# Patient Record
Sex: MALE | Race: WHITE | Employment: OTHER
[De-identification: names, ages, dates, MRNs, and addresses within clinical notes are randomized per-mention and may not be internally consistent; named-entity substitution may affect disease eponyms.]

---

## 2023-10-17 SDOH — HEALTH STABILITY: PHYSICAL HEALTH: ON AVERAGE, HOW MANY MINUTES DO YOU ENGAGE IN EXERCISE AT THIS LEVEL?: 30 MIN

## 2023-10-17 SDOH — HEALTH STABILITY: PHYSICAL HEALTH: ON AVERAGE, HOW MANY DAYS PER WEEK DO YOU ENGAGE IN MODERATE TO STRENUOUS EXERCISE (LIKE A BRISK WALK)?: 3 DAYS

## 2023-10-17 ASSESSMENT — SOCIAL DETERMINANTS OF HEALTH (SDOH)
WITHIN THE LAST YEAR, HAVE YOU BEEN HUMILIATED OR EMOTIONALLY ABUSED IN OTHER WAYS BY YOUR PARTNER OR EX-PARTNER?: NO
WITHIN THE LAST YEAR, HAVE YOU BEEN AFRAID OF YOUR PARTNER OR EX-PARTNER?: NO
WITHIN THE LAST YEAR, HAVE TO BEEN RAPED OR FORCED TO HAVE ANY KIND OF SEXUAL ACTIVITY BY YOUR PARTNER OR EX-PARTNER?: NO
WITHIN THE LAST YEAR, HAVE YOU BEEN KICKED, HIT, SLAPPED, OR OTHERWISE PHYSICALLY HURT BY YOUR PARTNER OR EX-PARTNER?: NO

## 2023-10-19 ENCOUNTER — OFFICE VISIT (OUTPATIENT)
Dept: INTERNAL MEDICINE CLINIC | Facility: CLINIC | Age: 63
End: 2023-10-19
Payer: MEDICARE

## 2023-10-19 VITALS
BODY MASS INDEX: 24.49 KG/M2 | WEIGHT: 147 LBS | DIASTOLIC BLOOD PRESSURE: 74 MMHG | OXYGEN SATURATION: 97 % | HEART RATE: 86 BPM | HEIGHT: 65 IN | SYSTOLIC BLOOD PRESSURE: 133 MMHG | TEMPERATURE: 97.6 F

## 2023-10-19 DIAGNOSIS — M79.672 PAIN IN BOTH FEET: ICD-10-CM

## 2023-10-19 DIAGNOSIS — M79.671 PAIN IN BOTH FEET: ICD-10-CM

## 2023-10-19 DIAGNOSIS — J30.9 ALLERGIC RHINITIS, UNSPECIFIED SEASONALITY, UNSPECIFIED TRIGGER: ICD-10-CM

## 2023-10-19 DIAGNOSIS — E78.2 MIXED HYPERLIPIDEMIA: Primary | ICD-10-CM

## 2023-10-19 DIAGNOSIS — M25.522 PAIN OF BOTH ELBOWS: ICD-10-CM

## 2023-10-19 DIAGNOSIS — Z12.5 PROSTATE CANCER SCREENING: ICD-10-CM

## 2023-10-19 DIAGNOSIS — M25.521 PAIN OF BOTH ELBOWS: ICD-10-CM

## 2023-10-19 PROCEDURE — 99204 OFFICE O/P NEW MOD 45 MIN: CPT | Performed by: INTERNAL MEDICINE

## 2023-10-19 PROCEDURE — 1123F ACP DISCUSS/DSCN MKR DOCD: CPT | Performed by: INTERNAL MEDICINE

## 2023-10-19 RX ORDER — MONTELUKAST SODIUM 10 MG/1
10 TABLET ORAL DAILY
Qty: 90 TABLET | Refills: 1 | Status: SHIPPED | OUTPATIENT
Start: 2023-10-19

## 2023-10-19 RX ORDER — LEVOCETIRIZINE DIHYDROCHLORIDE 5 MG/1
5 TABLET, FILM COATED ORAL NIGHTLY
COMMUNITY

## 2023-10-19 SDOH — ECONOMIC STABILITY: HOUSING INSECURITY
IN THE LAST 12 MONTHS, WAS THERE A TIME WHEN YOU DID NOT HAVE A STEADY PLACE TO SLEEP OR SLEPT IN A SHELTER (INCLUDING NOW)?: NO

## 2023-10-19 SDOH — ECONOMIC STABILITY: FOOD INSECURITY: WITHIN THE PAST 12 MONTHS, THE FOOD YOU BOUGHT JUST DIDN'T LAST AND YOU DIDN'T HAVE MONEY TO GET MORE.: NEVER TRUE

## 2023-10-19 SDOH — ECONOMIC STABILITY: INCOME INSECURITY: HOW HARD IS IT FOR YOU TO PAY FOR THE VERY BASICS LIKE FOOD, HOUSING, MEDICAL CARE, AND HEATING?: NOT HARD AT ALL

## 2023-10-19 SDOH — ECONOMIC STABILITY: FOOD INSECURITY: WITHIN THE PAST 12 MONTHS, YOU WORRIED THAT YOUR FOOD WOULD RUN OUT BEFORE YOU GOT MONEY TO BUY MORE.: NEVER TRUE

## 2023-10-19 ASSESSMENT — ENCOUNTER SYMPTOMS
DIARRHEA: 0
EYE ITCHING: 0
ABDOMINAL PAIN: 0
EYE DISCHARGE: 0
SORE THROAT: 0
VOICE CHANGE: 0
SHORTNESS OF BREATH: 1
RHINORRHEA: 0
BACK PAIN: 0
NAUSEA: 0
COLOR CHANGE: 0
VOMITING: 0
BLOOD IN STOOL: 0
CONSTIPATION: 0
WHEEZING: 0
COUGH: 1
ANAL BLEEDING: 0

## 2023-10-19 ASSESSMENT — ANXIETY QUESTIONNAIRES
IF YOU CHECKED OFF ANY PROBLEMS ON THIS QUESTIONNAIRE, HOW DIFFICULT HAVE THESE PROBLEMS MADE IT FOR YOU TO DO YOUR WORK, TAKE CARE OF THINGS AT HOME, OR GET ALONG WITH OTHER PEOPLE: NOT DIFFICULT AT ALL
5. BEING SO RESTLESS THAT IT IS HARD TO SIT STILL: 0
7. FEELING AFRAID AS IF SOMETHING AWFUL MIGHT HAPPEN: 0
1. FEELING NERVOUS, ANXIOUS, OR ON EDGE: 0
6. BECOMING EASILY ANNOYED OR IRRITABLE: 0
GAD7 TOTAL SCORE: 0
2. NOT BEING ABLE TO STOP OR CONTROL WORRYING: 0
3. WORRYING TOO MUCH ABOUT DIFFERENT THINGS: 0
4. TROUBLE RELAXING: 0

## 2023-10-19 ASSESSMENT — PATIENT HEALTH QUESTIONNAIRE - PHQ9
SUM OF ALL RESPONSES TO PHQ QUESTIONS 1-9: 0
2. FEELING DOWN, DEPRESSED OR HOPELESS: 0
SUM OF ALL RESPONSES TO PHQ9 QUESTIONS 1 & 2: 0
SUM OF ALL RESPONSES TO PHQ QUESTIONS 1-9: 0
1. LITTLE INTEREST OR PLEASURE IN DOING THINGS: 0
SUM OF ALL RESPONSES TO PHQ QUESTIONS 1-9: 0
SUM OF ALL RESPONSES TO PHQ QUESTIONS 1-9: 0

## 2023-10-19 NOTE — PROGRESS NOTES
Jp Herbert M.D. Internal Medicine  [facility]  [address] [place] [address], [address] [place] [address]  Phone: [phone]  Fax: [phone]    Hyperlipidemia  This is a chronic problem. The current episode started more than 1 year ago. The problem is uncontrolled. Recent lipid tests were reviewed and are high. There are no known factors aggravating his hyperlipidemia. Associated symptoms include shortness of breath. Pertinent negatives include no chest pain or myalgias. He is currently on no antihyperlipidemic treatment. The current treatment provides no improvement of lipids. There are no compliance problems. Risk factors for coronary artery disease include male sex and dyslipidemia. Peter Vines is a 70 y.o. White (non-) male. Current Outpatient Medications   Medication Sig Dispense Refill    levocetirizine (XYZAL) 5 MG tablet Take 1 tablet by mouth nightly      montelukast (SINGULAIR) 10 MG tablet Take 1 tablet by mouth daily 90 tablet 1     No current facility-administered medications for this visit. Allergies   Allergen Reactions    Penicillins Hives and Other (See Comments)    Sulfa Antibiotics Hives and Other (See Comments)     Past Medical History:   Diagnosis Date    Allergic rhinitis     HLD (hyperlipidemia)      History reviewed. No pertinent surgical history. Social History     Tobacco Use    Smoking status: Never    Smokeless tobacco: Never   Vaping Use    Vaping Use: Never used   Substance Use Topics    Alcohol use: Never     Family History   Problem Relation Age of Onset    Diabetes Mother     Heart Murmur Father     Liver Cancer Father     Colon Cancer Neg Hx       Review of Systems   Constitutional:  Negative for chills, diaphoresis, fatigue, fever and unexpected weight change. HENT:  Negative for ear discharge, ear pain, hearing loss, postnasal drip, rhinorrhea, sore throat, tinnitus and voice change. Eyes:  Negative for discharge, itching and visual disturbance.

## 2023-10-23 ENCOUNTER — TELEPHONE (OUTPATIENT)
Dept: INTERNAL MEDICINE CLINIC | Facility: CLINIC | Age: 63
End: 2023-10-23

## 2023-10-23 NOTE — TELEPHONE ENCOUNTER
----- Message from Collette Puna sent at 10/23/2023  9:22 AM EDT -----  Subject: Referral Request    Reason for referral request? Ai Aj is saying the paperwork he has just is   saying an external referral to podiatry and he is not certain what that   mean? Please call to let him know who he should be checking with for an   appointment. Provider patient wants to be referred to(if known):     Provider Phone Number(if known):     Additional Information for Provider?   ---------------------------------------------------------------------------  --------------  [address]    [phone]; OK to respond with electronic message via [employer] portal (only   for patients who have registered [employer] account)  ---------------------------------------------------------------------------  --------------

## 2023-11-02 ENCOUNTER — CLINICAL DOCUMENTATION (OUTPATIENT)
Dept: ORTHOPEDIC SURGERY | Age: 63
End: 2023-11-02

## 2023-11-02 NOTE — PROGRESS NOTES
Patient asked that we request elbow notes from Dr Karan Whitney [id number] for upcoming visit w/ Dr Cheryl Steen. Request faxed.

## 2023-11-14 ENCOUNTER — OFFICE VISIT (OUTPATIENT)
Dept: ENT CLINIC | Age: 63
End: 2023-11-14
Payer: MEDICARE

## 2023-11-14 VITALS
DIASTOLIC BLOOD PRESSURE: 76 MMHG | BODY MASS INDEX: 23.82 KG/M2 | RESPIRATION RATE: 17 BRPM | WEIGHT: 143 LBS | SYSTOLIC BLOOD PRESSURE: 122 MMHG | HEIGHT: 65 IN

## 2023-11-14 DIAGNOSIS — J34.3 NASAL TURBINATE HYPERTROPHY: ICD-10-CM

## 2023-11-14 DIAGNOSIS — M95.0 NASAL VALVE COLLAPSE: ICD-10-CM

## 2023-11-14 DIAGNOSIS — J34.89 NASAL OBSTRUCTION: Primary | ICD-10-CM

## 2023-11-14 DIAGNOSIS — J34.2 DEVIATED NASAL SEPTUM: ICD-10-CM

## 2023-11-14 DIAGNOSIS — J30.9 CHRONIC ALLERGIC RHINITIS: ICD-10-CM

## 2023-11-14 PROCEDURE — 31231 NASAL ENDOSCOPY DX: CPT | Performed by: STUDENT IN AN ORGANIZED HEALTH CARE EDUCATION/TRAINING PROGRAM

## 2023-11-14 PROCEDURE — 1123F ACP DISCUSS/DSCN MKR DOCD: CPT | Performed by: STUDENT IN AN ORGANIZED HEALTH CARE EDUCATION/TRAINING PROGRAM

## 2023-11-14 PROCEDURE — 99204 OFFICE O/P NEW MOD 45 MIN: CPT | Performed by: STUDENT IN AN ORGANIZED HEALTH CARE EDUCATION/TRAINING PROGRAM

## 2023-11-14 RX ORDER — FLUTICASONE PROPIONATE 50 MCG
1 SPRAY, SUSPENSION (ML) NASAL DAILY
COMMUNITY

## 2023-11-14 ASSESSMENT — ENCOUNTER SYMPTOMS
CONSTIPATION: 0
FACIAL SWELLING: 0
DIARRHEA: 0
SHORTNESS OF BREATH: 0
SINUS PRESSURE: 0
EYE ITCHING: 0
WHEEZING: 0
CHOKING: 0
EYE DISCHARGE: 0
RHINORRHEA: 1
APNEA: 0
COUGH: 0
SINUS PAIN: 0
STRIDOR: 0
EYE PAIN: 0
NAUSEA: 0

## 2023-11-14 NOTE — PROGRESS NOTES
1st attempt to call regarding COVID-19 Virtual Care Program. Left voicemail with [phone] number to return call. Will attempt call again.    If patient returns this call, please do not send a message to the person that called them. Please follow Knowledgebase   Workflows. Please document the outcome and reason for call.     HPI:  Parmjit Christopher is a 70 y.o. male seen New    Chief Complaint   Patient presents with    Allergic Rhinitis      Patient presents today with c/o chronic allergic rhinitis x most of his life . Patient states that he also possibly has deviation as well . Patient would like to discuss potential surgery . Patient states that he has been recommended to see Allergist .  He has not seen much improvement with current regimen ( flonase , xyzal , and singulair )        68-year-old male seen as a new patient referral evaluation having concern of long-term sinonasal congestion and obstruction and allergic rhinitis. He has had this for the majority of his life and did have nasal trauma as an adolescent. He has had severe nasal obstruction since his teenage years. Now that he is retired he is going to go forward with potential surgical options to fix this since he has more time. He has been told that he has a DV nasal septum in the past.  He has done very long-term treatment for the nasal obstruction and for his allergic rhinitis. He continues to have significant sneezing and rhinorrhea despite long-term intranasal allergy sprays oral antihistamines and Singulair. Past Medical History, Past Surgical History, Family history, Social History, and Medications were all reviewed with the patient today and updated as necessary. Allergies   Allergen Reactions    Penicillins Hives and Other (See Comments)    Sulfa Antibiotics Hives and Other (See Comments)       There is no problem list on file for this patient. Current Outpatient Medications   Medication Sig    fluticasone (FLONASE) 50 MCG/ACT nasal spray 1 spray by Each Nostril route daily    levocetirizine (XYZAL) 5 MG tablet Take 1 tablet by mouth nightly    montelukast (SINGULAIR) 10 MG tablet Take 1 tablet by mouth daily     No current facility-administered medications for this visit.        Past Medical History:   Diagnosis Date    Allergic rhinitis     Fracture of

## 2023-11-15 ENCOUNTER — TELEPHONE (OUTPATIENT)
Dept: INTERNAL MEDICINE CLINIC | Facility: CLINIC | Age: 63
End: 2023-11-15

## 2023-11-15 RX ORDER — ROSUVASTATIN CALCIUM 40 MG/1
40 TABLET, COATED ORAL DAILY
Qty: 90 TABLET | Refills: 1 | Status: SHIPPED | OUTPATIENT
Start: 2023-11-15

## 2023-11-15 NOTE — TELEPHONE ENCOUNTER
Spoke with patient advised per  Ca score pos at about 100 meaning he does have some CAD. Based on this I would recommend Statin therapy, Crestor 40, to lower risk for morbidity/mortality from CAD. Pt expressed understanding, requested it be sent to pharmacy.

## 2023-11-15 NOTE — TELEPHONE ENCOUNTER
----- Message from Melissa Rubio MD sent at 11/15/2023  7:23 AM EST -----  Ca score pos at about 100 meaning he does have some CAD. Based on this I would recommend Statin therapy, Crestor 40, to lower risk for morbidity/mortality from CAD.

## 2023-11-16 ENCOUNTER — OFFICE VISIT (OUTPATIENT)
Dept: ORTHOPEDIC SURGERY | Age: 63
End: 2023-11-16
Payer: MEDICARE

## 2023-11-16 DIAGNOSIS — M77.02 BILATERAL MEDIAL EPICONDYLITIS OF ELBOW JOINT: Primary | ICD-10-CM

## 2023-11-16 DIAGNOSIS — M77.01 BILATERAL MEDIAL EPICONDYLITIS OF ELBOW JOINT: Primary | ICD-10-CM

## 2023-11-16 PROCEDURE — 99204 OFFICE O/P NEW MOD 45 MIN: CPT | Performed by: ORTHOPAEDIC SURGERY

## 2023-11-16 PROCEDURE — 1123F ACP DISCUSS/DSCN MKR DOCD: CPT | Performed by: ORTHOPAEDIC SURGERY

## 2023-12-07 ENCOUNTER — EVALUATION (OUTPATIENT)
Age: 63
End: 2023-12-07

## 2023-12-07 DIAGNOSIS — R29.898 WEAKNESS OF BOTH UPPER EXTREMITIES: ICD-10-CM

## 2023-12-07 DIAGNOSIS — M25.811 IMPINGEMENT OF RIGHT SHOULDER: ICD-10-CM

## 2023-12-07 DIAGNOSIS — Z78.9 IMPAIRED MOTOR CONTROL: ICD-10-CM

## 2023-12-07 DIAGNOSIS — M77.01 EPICONDYLITIS ELBOW, MEDIAL, RIGHT: Primary | ICD-10-CM

## 2023-12-07 DIAGNOSIS — M77.12 EPICONDYLITIS, LATERAL, LEFT: ICD-10-CM

## 2023-12-07 NOTE — PROGRESS NOTES
Flexion No no    Abduction no no    Functional  ER T2 T2    Functional IR T7 T8    Elbow      Flexion No No    Extension no no    Wrist      Flexion No no    Extension no no      Strength/MMT (0-5 Scale): Shoulder Right Left Comment   Flexion 5 5    Abduction 5 5    Scaption 5 5    IR  5* 5    ER 4 4    Lower trapezius 4 4    Middle trapezius 4+ 4+    Posterior deltoid 4 4    Elbow      Flexion 5 5    Extension 4+ 4+    Wrist      Flexion 5 5    Extension 5 5     (lbs) 48.7 49.3 Progressive loss on R     Special Tests/Function  Shoulder: Shoulder Impingement: Painful Arc: --  Infraspinatus: -  Crocker-Gilberto: -  Neer's: +  RTC Tendinopathy: Drop Arm: -  Elbow: Stability: Varus: -  Valgus: -  Lateral Epicondylalgia: Cozen's: +  Mill's: +  Medial Epicondylalgia: Mill's: +    Treatment provided today consisted of initial evaluation followed by:  Manual therapy (29632) x 15 min utilizing techniques to improve joint and/or soft tissue mobility, ROM, and function as well as helping to decrease pain/spasms and swelling. **Patient educated on and provided verbal consent for grade 5 mobilizations  Palpation and assessment of soft tissue, muscles, and landmarks   Thoracic mobilization  Prone extension (T9/6/3) grade 5    Therapeutic exercise (81476) x 10 min to address ROM/strength deficits and to develop an initial HEP as noted below. Patient Education on the condition/pathology, involved anatomy, and exercise rationale.     CLINICAL DECISION MAKING/ASSESSMENT     Personal Factors/co-morbidities affecting POC (1-2 Medium/3+High): age  coping styles/strategies  handedness  habits/routines   Problem List: (1-2 Low/ 3 Medium/ 4+ High) Pain  ROM limitations  Soft tissue restrictions  Strength deficits  Motor control deficits  Restricted recreational participation    Clinical decision making: moderate complexity with questionable prediction of expectations and future outcomes which may require adjustments to the

## 2023-12-12 ENCOUNTER — TREATMENT (OUTPATIENT)
Age: 63
End: 2023-12-12
Payer: MEDICARE

## 2023-12-12 DIAGNOSIS — M77.01 EPICONDYLITIS ELBOW, MEDIAL, RIGHT: Primary | ICD-10-CM

## 2023-12-12 DIAGNOSIS — M25.811 IMPINGEMENT OF RIGHT SHOULDER: ICD-10-CM

## 2023-12-12 DIAGNOSIS — M77.12 EPICONDYLITIS, LATERAL, LEFT: ICD-10-CM

## 2023-12-12 DIAGNOSIS — Z78.9 IMPAIRED MOTOR CONTROL: ICD-10-CM

## 2023-12-12 DIAGNOSIS — R29.898 WEAKNESS OF BOTH UPPER EXTREMITIES: ICD-10-CM

## 2023-12-12 PROCEDURE — 97140 MANUAL THERAPY 1/> REGIONS: CPT | Performed by: PHYSICAL THERAPIST

## 2023-12-12 PROCEDURE — 97110 THERAPEUTIC EXERCISES: CPT | Performed by: PHYSICAL THERAPIST

## 2023-12-12 NOTE — PROGRESS NOTES
and/or soft tissue mobility, ROM, and function as well as helping to decrease pain/spasms and swelling. Palpation and assessment of soft tissue, muscles, and landmarks   STM to bilateral wrist flexors and wrist extensors on the L. MWM  to elbow with pt simultaneously squeezing object. Therapeutic exercise (76024) x 20 min to address ROM/strength deficits  Passive stretching to bilateral wrist flexors and extensors  Kinesiotaping \"I\" strip using mechanical hold to unload extensor wad off L lateral condyle and R medial condyle using % tension  \"I\" strip going distal>proximal over wrist flexors using 15% tension to relax   Patient Education on how to properly stretch wrist flexors/extensors, and to stop doing exercises that entail gripping to take the pressure off insertion of wrist flexors and extensors. Educated in use of tape and safe removal.    ASSESSMENT     Pt presents with lateral epicondylitis on L and medial epicondylitis on R. He has mod tightness throughout his wrist flexors and extensors and would benefit from continued STM, joint mobilizations, stretching and taping. He is more aware of what activities he should avoid to prevent exacerbation of symptoms. PLAN      Cont therapy as per plan  On next visit: reassess effectiveness of last visit  continued STM, joint mobilizations, stretching and taping. Effective Dates/Duration: 12/7/2023 TO 2/5/2024 (60 days).     Frequency: 2x/week   Interventions may include but are not limited to: (10499) Therapeutic exercise to develop ROM, strength, endurance and flexibility  (69125) Therapeutic activities using dynamic activities to improve function  (58702) Manual therapy techniques to improve joint and/or soft tissue mobility, ROM, and function as well as helping to decrease pain/spasms and swelling  (46516/65390) Dry needling for the management of neuromusculoskeletal pain and movement impairment  Home exercise program (HEP) development  Modalities

## 2023-12-14 ENCOUNTER — TREATMENT (OUTPATIENT)
Age: 63
End: 2023-12-14

## 2023-12-14 DIAGNOSIS — M77.12 EPICONDYLITIS, LATERAL, LEFT: ICD-10-CM

## 2023-12-14 DIAGNOSIS — M25.811 IMPINGEMENT OF RIGHT SHOULDER: ICD-10-CM

## 2023-12-14 DIAGNOSIS — R29.898 WEAKNESS OF BOTH UPPER EXTREMITIES: ICD-10-CM

## 2023-12-14 DIAGNOSIS — M77.01 EPICONDYLITIS ELBOW, MEDIAL, RIGHT: Primary | ICD-10-CM

## 2023-12-14 NOTE — PROGRESS NOTES
QuickDASH Score to </=  10% impairment, demonstrating improved overall function. Belly  Access Code: [email or web address]  URL: [email or web address]  Date: 12/07/2023  Prepared by: Karishma Goodrich    Exercises  - Seated Thoracic Extension  - 2 x daily - 10 reps - 5 hold  - Reverse Push Ups  - 2 x daily - 2 sets - 15 reps - 2 hold  - Doorway Pec Stretch at 60 Degrees Abduction with Arm Straight  - 2 x daily - 10 reps - 5 hold

## 2023-12-28 ENCOUNTER — TREATMENT (OUTPATIENT)
Age: 63
End: 2023-12-28
Payer: MEDICARE

## 2023-12-28 DIAGNOSIS — R29.898 WEAKNESS OF BOTH UPPER EXTREMITIES: ICD-10-CM

## 2023-12-28 DIAGNOSIS — M77.01 EPICONDYLITIS ELBOW, MEDIAL, RIGHT: Primary | ICD-10-CM

## 2023-12-28 DIAGNOSIS — M77.12 EPICONDYLITIS, LATERAL, LEFT: ICD-10-CM

## 2023-12-28 DIAGNOSIS — M25.811 IMPINGEMENT OF RIGHT SHOULDER: ICD-10-CM

## 2023-12-28 DIAGNOSIS — Z78.9 IMPAIRED MOTOR CONTROL: ICD-10-CM

## 2023-12-28 PROCEDURE — 97140 MANUAL THERAPY 1/> REGIONS: CPT

## 2023-12-28 PROCEDURE — 97110 THERAPEUTIC EXERCISES: CPT

## 2023-12-28 PROCEDURE — 97530 THERAPEUTIC ACTIVITIES: CPT

## 2023-12-28 NOTE — PROGRESS NOTES
[address] [address]  Dept: [phone]     Physical Therapy Daily Note     Referring MD: Claire Suero MD  Diagnosis:     ICD-10-CM    1. Epicondylitis elbow, medial, right  M77.01       2. Epicondylitis, lateral, left  M77.12       3. Impingement of right shoulder  M25.811       4. Weakness of both upper extremities  R29.898       5. Impaired motor control  Z78.9          Surgery: n/a    Therapy precautions:None  Co-morbidities affecting plan of care: n/a    PERTINENT MEDICAL HISTORY     Past medical and surgical history:   Past Medical History:   Diagnosis Date    Allergic rhinitis     Fracture of nasal bone My nose was hit first when I was 13 and again when I was 50 causing a severe deviated septum. HLD (hyperlipidemia)       No past surgical history on file. Medications: reviewed in chart   Allergies: Allergies   Allergen Reactions    Penicillins Hives and Other (See Comments)    Sulfa Antibiotics Hives and Other (See Comments)      Chief complaints/history of injury: Patient reports onset of R medial elbow pain approximately 1 year ago. He did not have a specific TERNA, but recalls transitioning from playing golf outdoors on grass to a mat; he believes the different surface created more pressure when swinging through, as the mat resists the clubhead more. Since original onset, he attempted to adjust his wing, but has experienced onset of L lateral elbow pain and R anterior shoulder pain. There is minimal difficulty in ADLs, but it is affecting his ability to enjoy golf. He states he is planning to take the time to rest and rehab this winter so he can be fully healthy.     Date symptoms began: 11/2022  Tia Mccarthy of condition: Chronic (continuous duration > 3 months)  Primary cause of current episode: Repetitive  How did symptoms start: see above  Describe current symptoms: B elbow discomfort with gripping and rotation    Received previous outpatient

## 2024-01-02 ENCOUNTER — TREATMENT (OUTPATIENT)
Age: 64
End: 2024-01-02
Payer: MEDICARE

## 2024-01-02 DIAGNOSIS — Z78.9 IMPAIRED MOTOR CONTROL: ICD-10-CM

## 2024-01-02 DIAGNOSIS — M25.811 IMPINGEMENT OF RIGHT SHOULDER: ICD-10-CM

## 2024-01-02 DIAGNOSIS — R29.898 WEAKNESS OF BOTH UPPER EXTREMITIES: ICD-10-CM

## 2024-01-02 DIAGNOSIS — M77.12 EPICONDYLITIS, LATERAL, LEFT: ICD-10-CM

## 2024-01-02 DIAGNOSIS — M77.01 EPICONDYLITIS ELBOW, MEDIAL, RIGHT: Primary | ICD-10-CM

## 2024-01-02 PROCEDURE — 20560 NDL INSJ W/O NJX 1 OR 2 MUSC: CPT

## 2024-01-02 PROCEDURE — 97032 APPL MODALITY 1+ESTIM EA 15: CPT

## 2024-01-02 PROCEDURE — 97110 THERAPEUTIC EXERCISES: CPT

## 2024-01-02 PROCEDURE — 97140 MANUAL THERAPY 1/> REGIONS: CPT

## 2024-01-02 NOTE — PROGRESS NOTES
[facility]  [address] [facility] [address]  Dept: [phone]     Physical Therapy Daily Note     Referring MD: Friend, Carrie HARTMANN MD  Diagnosis:     ICD-10-CM    1. Epicondylitis elbow, medial, right  M77.01       2. Epicondylitis, lateral, left  M77.12       3. Impingement of right shoulder  M25.811       4. Weakness of both upper extremities  R29.898       5. Impaired motor control  Z78.9          Surgery: n/a    Therapy precautions:None  Co-morbidities affecting plan of care: n/a    PERTINENT MEDICAL HISTORY     Past medical and surgical history:   Past Medical History:   Diagnosis Date    Allergic rhinitis     Fracture of nasal bone My nose was hit first when I was 11 and again when I was 47 causing a severe deviated septum.    HLD (hyperlipidemia)       No past surgical history on file.  Medications: reviewed in chart   Allergies:   Allergies   Allergen Reactions    Penicillins Hives and Other (See Comments)    Sulfa Antibiotics Hives and Other (See Comments)      Chief complaints/history of injury: Patient reports onset of R medial elbow pain approximately 1 year ago. He did not have a specific TRENA, but recalls transitioning from playing golf outdoors on grass to a mat; he believes the different surface created more pressure when swinging through, as the mat resists the clubhead more. Since original onset, he attempted to adjust his wing, but has experienced onset of L lateral elbow pain and R anterior shoulder pain. There is minimal difficulty in ADLs, but it is affecting his ability to enjoy golf. He states he is planning to take the time to rest and rehab this winter so he can be fully healthy.    Date symptoms began: 11/2022  Nature of condition: Chronic (continuous duration > 3 months)  Primary cause of current episode: Repetitive  How did symptoms start: see above  Describe current symptoms: B elbow discomfort with gripping and rotation    Received previous outpatient

## 2024-01-04 ENCOUNTER — TREATMENT (OUTPATIENT)
Age: 64
End: 2024-01-04

## 2024-01-04 DIAGNOSIS — M25.811 IMPINGEMENT OF RIGHT SHOULDER: ICD-10-CM

## 2024-01-04 DIAGNOSIS — R29.898 WEAKNESS OF BOTH UPPER EXTREMITIES: ICD-10-CM

## 2024-01-04 DIAGNOSIS — Z78.9 IMPAIRED MOTOR CONTROL: ICD-10-CM

## 2024-01-04 DIAGNOSIS — M77.01 EPICONDYLITIS ELBOW, MEDIAL, RIGHT: Primary | ICD-10-CM

## 2024-01-04 DIAGNOSIS — M77.12 EPICONDYLITIS, LATERAL, LEFT: ICD-10-CM

## 2024-01-04 NOTE — PROGRESS NOTES
[facility]  [address] [facility] [address]  Dept: [phone]     Physical Therapy Daily Note     Referring MD: Friend, Carrie HARTMANN MD  Diagnosis:     ICD-10-CM    1. Epicondylitis elbow, medial, right  M77.01       2. Epicondylitis, lateral, left  M77.12       3. Impingement of right shoulder  M25.811       4. Weakness of both upper extremities  R29.898       5. Impaired motor control  Z78.9          Surgery: n/a    Therapy precautions:None  Co-morbidities affecting plan of care: n/a    PERTINENT MEDICAL HISTORY     Past medical and surgical history:   Past Medical History:   Diagnosis Date    Allergic rhinitis     Fracture of nasal bone My nose was hit first when I was 11 and again when I was 47 causing a severe deviated septum.    HLD (hyperlipidemia)       No past surgical history on file.  Medications: reviewed in chart   Allergies:   Allergies   Allergen Reactions    Penicillins Hives and Other (See Comments)    Sulfa Antibiotics Hives and Other (See Comments)      Chief complaints/history of injury: Patient reports onset of R medial elbow pain approximately 1 year ago. He did not have a specific TRENA, but recalls transitioning from playing golf outdoors on grass to a mat; he believes the different surface created more pressure when swinging through, as the mat resists the clubhead more. Since original onset, he attempted to adjust his wing, but has experienced onset of L lateral elbow pain and R anterior shoulder pain. There is minimal difficulty in ADLs, but it is affecting his ability to enjoy golf. He states he is planning to take the time to rest and rehab this winter so he can be fully healthy.    Date symptoms began: 11/2022  Nature of condition: Chronic (continuous duration > 3 months)  Primary cause of current episode: Repetitive  How did symptoms start: see above  Describe current symptoms: B elbow discomfort with gripping and rotation    Received previous outpatient

## 2024-01-09 ENCOUNTER — TREATMENT (OUTPATIENT)
Age: 64
End: 2024-01-09
Payer: MEDICARE

## 2024-01-09 ENCOUNTER — TELEPHONE (OUTPATIENT)
Dept: ENT CLINIC | Age: 64
End: 2024-01-09

## 2024-01-09 DIAGNOSIS — M77.12 EPICONDYLITIS, LATERAL, LEFT: ICD-10-CM

## 2024-01-09 DIAGNOSIS — M77.01 EPICONDYLITIS ELBOW, MEDIAL, RIGHT: Primary | ICD-10-CM

## 2024-01-09 DIAGNOSIS — Z78.9 IMPAIRED MOTOR CONTROL: ICD-10-CM

## 2024-01-09 DIAGNOSIS — M25.811 IMPINGEMENT OF RIGHT SHOULDER: ICD-10-CM

## 2024-01-09 DIAGNOSIS — R29.898 WEAKNESS OF BOTH UPPER EXTREMITIES: ICD-10-CM

## 2024-01-09 PROCEDURE — 97110 THERAPEUTIC EXERCISES: CPT

## 2024-01-09 PROCEDURE — 97140 MANUAL THERAPY 1/> REGIONS: CPT

## 2024-01-09 NOTE — TELEPHONE ENCOUNTER
Patient left  stating that he has had consult with Plastics and would like to coordinate surgeries with Dr. Roberto and Dr. Ricardo Lewis . Per Dr. Roberto he will contact Dr. Lewis .

## 2024-01-09 NOTE — PROGRESS NOTES
[facility]  [address] [facility] [address]  Dept: [phone]     Physical Therapy Progress Report     Referring MD: Friend, Carrie HARTMANN MD  Diagnosis:     ICD-10-CM    1. Epicondylitis elbow, medial, right  M77.01       2. Epicondylitis, lateral, left  M77.12       3. Impingement of right shoulder  M25.811       4. Weakness of both upper extremities  R29.898       5. Impaired motor control  Z78.9          Surgery: n/a    Therapy precautions:None  Co-morbidities affecting plan of care: n/a    PERTINENT MEDICAL HISTORY     Past medical and surgical history:   Past Medical History:   Diagnosis Date    Allergic rhinitis     Fracture of nasal bone My nose was hit first when I was 11 and again when I was 47 causing a severe deviated septum.    HLD (hyperlipidemia)       No past surgical history on file.  Medications: reviewed in chart   Allergies:   Allergies   Allergen Reactions    Penicillins Hives and Other (See Comments)    Sulfa Antibiotics Hives and Other (See Comments)      Chief complaints/history of injury: Patient reports onset of R medial elbow pain approximately 1 year ago. He did not have a specific TRENA, but recalls transitioning from playing golf outdoors on grass to a mat; he believes the different surface created more pressure when swinging through, as the mat resists the clubhead more. Since original onset, he attempted to adjust his wing, but has experienced onset of L lateral elbow pain and R anterior shoulder pain. There is minimal difficulty in ADLs, but it is affecting his ability to enjoy golf. He states he is planning to take the time to rest and rehab this winter so he can be fully healthy.    Date symptoms began: 11/2022  Nature of condition: Chronic (continuous duration > 3 months)  Primary cause of current episode: Repetitive  How did symptoms start: see above  Describe current symptoms: B elbow discomfort with gripping and rotation    Received previous outpatient

## 2024-01-11 ENCOUNTER — TREATMENT (OUTPATIENT)
Age: 64
End: 2024-01-11
Payer: MEDICARE

## 2024-01-11 DIAGNOSIS — R29.898 WEAKNESS OF BOTH UPPER EXTREMITIES: ICD-10-CM

## 2024-01-11 DIAGNOSIS — Z78.9 IMPAIRED MOTOR CONTROL: ICD-10-CM

## 2024-01-11 DIAGNOSIS — M77.12 EPICONDYLITIS, LATERAL, LEFT: ICD-10-CM

## 2024-01-11 DIAGNOSIS — M25.811 IMPINGEMENT OF RIGHT SHOULDER: ICD-10-CM

## 2024-01-11 DIAGNOSIS — M77.01 EPICONDYLITIS ELBOW, MEDIAL, RIGHT: Primary | ICD-10-CM

## 2024-01-11 PROCEDURE — 97110 THERAPEUTIC EXERCISES: CPT

## 2024-01-11 PROCEDURE — 20560 NDL INSJ W/O NJX 1 OR 2 MUSC: CPT

## 2024-01-11 PROCEDURE — 97032 APPL MODALITY 1+ESTIM EA 15: CPT

## 2024-01-11 PROCEDURE — 97140 MANUAL THERAPY 1/> REGIONS: CPT

## 2024-01-11 NOTE — PROGRESS NOTES
[facility]  [address] [facility] [address]  Dept: [phone]     Physical Therapy Progress Report     Referring MD: Friend, Carrie HARTMANN MD  Diagnosis:     ICD-10-CM    1. Epicondylitis elbow, medial, right  M77.01       2. Epicondylitis, lateral, left  M77.12       3. Impingement of right shoulder  M25.811       4. Weakness of both upper extremities  R29.898       5. Impaired motor control  Z78.9          Surgery: n/a    Therapy precautions:None  Co-morbidities affecting plan of care: n/a    PERTINENT MEDICAL HISTORY     Past medical and surgical history:   Past Medical History:   Diagnosis Date    Allergic rhinitis     Fracture of nasal bone My nose was hit first when I was 11 and again when I was 47 causing a severe deviated septum.    HLD (hyperlipidemia)       No past surgical history on file.  Medications: reviewed in chart   Allergies:   Allergies   Allergen Reactions    Penicillins Hives and Other (See Comments)    Sulfa Antibiotics Hives and Other (See Comments)      Chief complaints/history of injury: Patient reports onset of R medial elbow pain approximately 1 year ago. He did not have a specific TRENA, but recalls transitioning from playing golf outdoors on grass to a mat; he believes the different surface created more pressure when swinging through, as the mat resists the clubhead more. Since original onset, he attempted to adjust his wing, but has experienced onset of L lateral elbow pain and R anterior shoulder pain. There is minimal difficulty in ADLs, but it is affecting his ability to enjoy golf. He states he is planning to take the time to rest and rehab this winter so he can be fully healthy.    PN (1/9/24)  Nature of condition: Chronic (continuous duration > 3 months)  Describe current symptoms: B medial elbow soreness with loading    Pain Assessment:  Pain location: Patient denies pain, only mild soreness  Average Pain/symptom intensity (0-10 scale)  Last 24 hours:

## 2024-01-15 ENCOUNTER — TREATMENT (OUTPATIENT)
Age: 64
End: 2024-01-15
Payer: MEDICARE

## 2024-01-15 DIAGNOSIS — R29.898 WEAKNESS OF BOTH UPPER EXTREMITIES: ICD-10-CM

## 2024-01-15 DIAGNOSIS — Z78.9 IMPAIRED MOTOR CONTROL: ICD-10-CM

## 2024-01-15 DIAGNOSIS — M25.811 IMPINGEMENT OF RIGHT SHOULDER: ICD-10-CM

## 2024-01-15 DIAGNOSIS — M77.12 EPICONDYLITIS, LATERAL, LEFT: ICD-10-CM

## 2024-01-15 DIAGNOSIS — M77.01 EPICONDYLITIS ELBOW, MEDIAL, RIGHT: Primary | ICD-10-CM

## 2024-01-15 PROCEDURE — 97110 THERAPEUTIC EXERCISES: CPT

## 2024-01-15 PROCEDURE — 97140 MANUAL THERAPY 1/> REGIONS: CPT

## 2024-01-15 NOTE — PROGRESS NOTES
[facility]  [address] [facility] [address]  Dept: [phone]     Physical Therapy Progress Report     Referring MD: Friend, Carrie HARTMANN MD  Diagnosis:     ICD-10-CM    1. Epicondylitis elbow, medial, right  M77.01       2. Epicondylitis, lateral, left  M77.12       3. Impingement of right shoulder  M25.811       4. Weakness of both upper extremities  R29.898       5. Impaired motor control  Z78.9          Surgery: n/a    Therapy precautions:None  Co-morbidities affecting plan of care: n/a    PERTINENT MEDICAL HISTORY     Past medical and surgical history:   Past Medical History:   Diagnosis Date    Allergic rhinitis     Fracture of nasal bone My nose was hit first when I was 11 and again when I was 47 causing a severe deviated septum.    HLD (hyperlipidemia)       No past surgical history on file.  Medications: reviewed in chart   Allergies:   Allergies   Allergen Reactions    Penicillins Hives and Other (See Comments)    Sulfa Antibiotics Hives and Other (See Comments)      Chief complaints/history of injury: Patient reports onset of R medial elbow pain approximately 1 year ago. He did not have a specific TRENA, but recalls transitioning from playing golf outdoors on grass to a mat; he believes the different surface created more pressure when swinging through, as the mat resists the clubhead more. Since original onset, he attempted to adjust his wing, but has experienced onset of L lateral elbow pain and R anterior shoulder pain. There is minimal difficulty in ADLs, but it is affecting his ability to enjoy golf. He states he is planning to take the time to rest and rehab this winter so he can be fully healthy.    PN (1/9/24)  Nature of condition: Chronic (continuous duration > 3 months)  Describe current symptoms: B medial elbow soreness with loading    Pain Assessment:  Pain location: Patient denies pain, only mild soreness  Average Pain/symptom intensity (0-10 scale)  Last 24 hours:

## 2024-01-16 ASSESSMENT — PATIENT HEALTH QUESTIONNAIRE - PHQ9
SUM OF ALL RESPONSES TO PHQ QUESTIONS 1-9: 0
SUM OF ALL RESPONSES TO PHQ9 QUESTIONS 1 & 2: 0
1. LITTLE INTEREST OR PLEASURE IN DOING THINGS: NOT AT ALL
2. FEELING DOWN, DEPRESSED OR HOPELESS: NOT AT ALL
SUM OF ALL RESPONSES TO PHQ QUESTIONS 1-9: 0
1. LITTLE INTEREST OR PLEASURE IN DOING THINGS: 0
SUM OF ALL RESPONSES TO PHQ QUESTIONS 1-9: 0
SUM OF ALL RESPONSES TO PHQ QUESTIONS 1-9: 0
SUM OF ALL RESPONSES TO PHQ9 QUESTIONS 1 & 2: 0
2. FEELING DOWN, DEPRESSED OR HOPELESS: 0

## 2024-01-18 ENCOUNTER — TREATMENT (OUTPATIENT)
Age: 64
End: 2024-01-18

## 2024-01-18 ENCOUNTER — OFFICE VISIT (OUTPATIENT)
Dept: INTERNAL MEDICINE CLINIC | Facility: CLINIC | Age: 64
End: 2024-01-18
Payer: MEDICARE

## 2024-01-18 VITALS
HEIGHT: 65 IN | DIASTOLIC BLOOD PRESSURE: 69 MMHG | SYSTOLIC BLOOD PRESSURE: 125 MMHG | BODY MASS INDEX: 24.62 KG/M2 | WEIGHT: 147.8 LBS | TEMPERATURE: 98.5 F | HEART RATE: 61 BPM | OXYGEN SATURATION: 98 %

## 2024-01-18 DIAGNOSIS — M77.01 EPICONDYLITIS ELBOW, MEDIAL, RIGHT: Primary | ICD-10-CM

## 2024-01-18 DIAGNOSIS — R29.898 WEAKNESS OF BOTH UPPER EXTREMITIES: ICD-10-CM

## 2024-01-18 DIAGNOSIS — J01.90 ACUTE SINUSITIS, RECURRENCE NOT SPECIFIED, UNSPECIFIED LOCATION: Primary | ICD-10-CM

## 2024-01-18 DIAGNOSIS — R09.89 CHEST CONGESTION: ICD-10-CM

## 2024-01-18 DIAGNOSIS — J04.0 LARYNGITIS: ICD-10-CM

## 2024-01-18 DIAGNOSIS — M77.12 EPICONDYLITIS, LATERAL, LEFT: ICD-10-CM

## 2024-01-18 DIAGNOSIS — M25.811 IMPINGEMENT OF RIGHT SHOULDER: ICD-10-CM

## 2024-01-18 DIAGNOSIS — Z78.9 IMPAIRED MOTOR CONTROL: ICD-10-CM

## 2024-01-18 PROCEDURE — 99213 OFFICE O/P EST LOW 20 MIN: CPT | Performed by: NURSE PRACTITIONER

## 2024-01-18 PROCEDURE — 1123F ACP DISCUSS/DSCN MKR DOCD: CPT | Performed by: NURSE PRACTITIONER

## 2024-01-18 RX ORDER — METHYLPREDNISOLONE 4 MG/1
TABLET ORAL
Qty: 1 KIT | Refills: 0 | Status: SHIPPED | OUTPATIENT
Start: 2024-01-18 | End: 2024-01-24

## 2024-01-18 RX ORDER — AZITHROMYCIN 250 MG/1
250 TABLET, FILM COATED ORAL SEE ADMIN INSTRUCTIONS
Qty: 6 TABLET | Refills: 0 | Status: SHIPPED | OUTPATIENT
Start: 2024-01-18 | End: 2024-01-23

## 2024-01-18 ASSESSMENT — ENCOUNTER SYMPTOMS
ABDOMINAL PAIN: 0
COUGH: 0
NAUSEA: 0
SORE THROAT: 1
SINUS PAIN: 0
EYE PAIN: 0
SHORTNESS OF BREATH: 0
CONSTIPATION: 0
VOMITING: 0
DIARRHEA: 0
BACK PAIN: 0
RHINORRHEA: 1

## 2024-01-18 NOTE — PROGRESS NOTES
[facility]ED[facility]  [address] [facility] [address]  Dept: [phone]     Physical Therapy Progress Report     Referring MD: Friend, Carrie HARTMANN MD  Diagnosis:     ICD-10-CM    1. Epicondylitis elbow, medial, right  M77.01       2. Epicondylitis, lateral, left  M77.12       3. Impingement of right shoulder  M25.811       4. Weakness of both upper extremities  R29.898       5. Impaired motor control  Z78.9          Surgery: n/a    Therapy precautions:None  Co-morbidities affecting plan of care: n/a    PERTINENT MEDICAL HISTORY     Past medical and surgical history:   Past Medical History:   Diagnosis Date    Allergic rhinitis     Fracture of nasal bone My nose was hit first when I was 11 and again when I was 47 causing a severe deviated septum.    HLD (hyperlipidemia)       No past surgical history on file.  Medications: reviewed in chart   Allergies:   Allergies   Allergen Reactions    Penicillins Hives and Other (See Comments)    Sulfa Antibiotics Hives and Other (See Comments)      Chief complaints/history of injury: Patient reports onset of R medial elbow pain approximately 1 year ago. He did not have a specific TRENA, but recalls transitioning from playing golf outdoors on grass to a mat; he believes the different surface created more pressure when swinging through, as the mat resists the clubhead more. Since original onset, he attempted to adjust his wing, but has experienced onset of L lateral elbow pain and R anterior shoulder pain. There is minimal difficulty in ADLs, but it is affecting his ability to enjoy golf. He states he is planning to take the time to rest and rehab this winter so he can be fully healthy.    PN (1/9/24)  Nature of condition: Chronic (continuous duration > 3 months)  Describe current symptoms: B medial elbow soreness with loading    Pain Assessment:  Pain location: Patient denies pain, only mild soreness  Average Pain/symptom intensity (0-10 scale)  Last 24 hours:

## 2024-01-18 NOTE — PROGRESS NOTES
mouth.         Advised on hand and respiratory hygiene. For any worsening symptoms, present to ER/hospital.  Cont meds and treatment plan by PCP and specialists.         Orders Placed This Encounter   Procedures    XR CHEST PA LAT (2 VIEWS)     Standing Status:   Future     Number of Occurrences:   1     Standing Expiration Date:   2/18/2024                  Follow Up  Return in about 3 months (around 4/23/2024), or if symptoms worsen or fail to improve, for Follow up with PCP Dr. KAREL Byrd.             Anna Oden, DNP, FNP-BC

## 2024-01-26 ENCOUNTER — TREATMENT (OUTPATIENT)
Age: 64
End: 2024-01-26

## 2024-01-26 DIAGNOSIS — M77.12 EPICONDYLITIS, LATERAL, LEFT: ICD-10-CM

## 2024-01-26 DIAGNOSIS — M25.811 IMPINGEMENT OF RIGHT SHOULDER: ICD-10-CM

## 2024-01-26 DIAGNOSIS — R29.898 WEAKNESS OF BOTH UPPER EXTREMITIES: ICD-10-CM

## 2024-01-26 DIAGNOSIS — M77.01 EPICONDYLITIS ELBOW, MEDIAL, RIGHT: Primary | ICD-10-CM

## 2024-01-26 DIAGNOSIS — Z78.9 IMPAIRED MOTOR CONTROL: ICD-10-CM

## 2024-01-26 NOTE — PROGRESS NOTES
requiring minimal verbal cuing for proper form and technique. Goal Met 12/28/2023  Pt. will be minimally tender with palpation demonstrating normalized muscular tone of the forearms. Goal Met 1/4/2024  Pt to subjectively report </= 2/10 pain to allow for increased participation in functional movements. Goal Met 1/9/2024  Increase AROM of cervical spine shoulder to be pain free with minimal restrictions, allowing for increased safety and function during ADLs Goal Met 1/9/2024  Increase MMT to >/= 4+/5 throughout involved shoulder and 4+/5 for elbow in order to perform household and work related tasks with greater ease. Goal Met 1/9/2024    Long term goals to be met by 2/5/2024 (60 days):  Pt to test negative for medial/lateral epicondylitis, demonstrating appropriate soft tissue healing and motor control for return to normal activities.  MMT involved Shoulder to >/= 5/5 allowing for normal lifting, reaching and pushing/pulling associated with ADLs.   Pt will report return to previous level of function without c/o pain.  Improve QuickDASH Score to </=  10% impairment, demonstrating improved overall function.    [employer]  Access Code: [email or web address]  URL: [email or web address]  Date: 12/28/2023  Prepared by: Dario Jaimes    Exercises  - Seated Thoracic Extension  - 2 x daily - 10 reps - 5 hold  - Reverse Push Ups  - 2 x daily - 2 sets - 15 reps - 2 hold  - Doorway Pec Stretch at 60 Degrees Abduction with Arm Straight  - 2 x daily - 10 reps - 5 hold  - Standing Wrist Radial Deviation with Hammer  - 1 x daily - 2 sets - 10 reps  - Standing Wrist Ulnar Deviation with Hammer  - 1 x daily - 2 sets - 10 reps  - Forearm Pronation and Supination with Hammer  - 1 x daily - 2 sets - 10 reps  - Mid Row with Anchored Resistance  - 2 x weekly - 2 sets - 15 reps - 2 hold - 20lb band  - Horizontal Abduction with Anchored Resistance  - 2 x weekly - 2 sets - 15 reps - 2 hold - 10lbs band  - Shoulder Extension with

## 2024-01-29 DIAGNOSIS — J32.9 RECURRENT RHINOSINUSITIS: Primary | ICD-10-CM

## 2024-01-29 DIAGNOSIS — J34.89 NASAL OBSTRUCTION: ICD-10-CM

## 2024-01-30 ENCOUNTER — TREATMENT (OUTPATIENT)
Age: 64
End: 2024-01-30
Payer: MEDICARE

## 2024-01-30 ENCOUNTER — TELEPHONE (OUTPATIENT)
Dept: ENT CLINIC | Age: 64
End: 2024-01-30

## 2024-01-30 DIAGNOSIS — R29.898 WEAKNESS OF BOTH UPPER EXTREMITIES: ICD-10-CM

## 2024-01-30 DIAGNOSIS — M77.12 EPICONDYLITIS, LATERAL, LEFT: ICD-10-CM

## 2024-01-30 DIAGNOSIS — Z78.9 IMPAIRED MOTOR CONTROL: ICD-10-CM

## 2024-01-30 DIAGNOSIS — M25.811 IMPINGEMENT OF RIGHT SHOULDER: ICD-10-CM

## 2024-01-30 DIAGNOSIS — M77.01 EPICONDYLITIS ELBOW, MEDIAL, RIGHT: Primary | ICD-10-CM

## 2024-01-30 PROCEDURE — 97530 THERAPEUTIC ACTIVITIES: CPT

## 2024-01-30 PROCEDURE — 97140 MANUAL THERAPY 1/> REGIONS: CPT

## 2024-01-30 PROCEDURE — 97110 THERAPEUTIC EXERCISES: CPT

## 2024-01-30 NOTE — TELEPHONE ENCOUNTER
Patient called requesting assistance to schedule CT scan .   I returned patient's call this afternoon , he states that he has been contacted and scheduled for Sinus CT .

## 2024-01-30 NOTE — PROGRESS NOTES
[facility] ORTHOPAED[facility]  [address] [facility] [address]  Dept: [phone]     Physical Therapy Daily Note     Referring MD: Friend, Carrie HARTMANN MD  Diagnosis:     ICD-10-CM    1. Epicondylitis elbow, medial, right  M77.01       2. Epicondylitis, lateral, left  M77.12       3. Impingement of right shoulder  M25.811       4. Weakness of both upper extremities  R29.898       5. Impaired motor control  Z78.9          Surgery: n/a    Therapy precautions:None  Co-morbidities affecting plan of care: n/a    PERTINENT MEDICAL HISTORY     Past medical and surgical history:   Past Medical History:   Diagnosis Date    Allergic rhinitis     Fracture of nasal bone My nose was hit first when I was 11 and again when I was 47 causing a severe deviated septum.    HLD (hyperlipidemia)       No past surgical history on file.  Medications: reviewed in chart   Allergies:   Allergies   Allergen Reactions    Penicillins Hives and Other (See Comments)    Sulfa Antibiotics Hives and Other (See Comments)      Chief complaints/history of injury: Patient reports onset of R medial elbow pain approximately 1 year ago. He did not have a specific TRENA, but recalls transitioning from playing golf outdoors on grass to a mat; he believes the different surface created more pressure when swinging through, as the mat resists the clubhead more. Since original onset, he attempted to adjust his wing, but has experienced onset of L lateral elbow pain and R anterior shoulder pain. There is minimal difficulty in ADLs, but it is affecting his ability to enjoy golf. He states he is planning to take the time to rest and rehab this winter so he can be fully healthy.    PN (1/9/24)  Nature of condition: Chronic (continuous duration > 3 months)  Describe current symptoms: B medial elbow soreness with loading    Pain Assessment:  Pain location: Patient denies pain, only mild soreness  Average Pain/symptom intensity (0-10 scale)  Last 24 hours:

## 2024-02-02 ENCOUNTER — TREATMENT (OUTPATIENT)
Age: 64
End: 2024-02-02

## 2024-02-02 DIAGNOSIS — R29.898 WEAKNESS OF BOTH UPPER EXTREMITIES: ICD-10-CM

## 2024-02-02 DIAGNOSIS — M25.811 IMPINGEMENT OF RIGHT SHOULDER: ICD-10-CM

## 2024-02-02 DIAGNOSIS — M77.12 EPICONDYLITIS, LATERAL, LEFT: ICD-10-CM

## 2024-02-02 DIAGNOSIS — Z78.9 IMPAIRED MOTOR CONTROL: ICD-10-CM

## 2024-02-02 DIAGNOSIS — M77.01 EPICONDYLITIS ELBOW, MEDIAL, RIGHT: Primary | ICD-10-CM

## 2024-02-02 NOTE — PROGRESS NOTES
[facility] ORTHOPAED[facility]  [address] [facility] [address]  Dept: [phone]     Physical Therapy Updated Plan of Care     Referring MD: Friend, Carrie HARTMANN MD  Diagnosis:     ICD-10-CM    1. Epicondylitis elbow, medial, right  M77.01       2. Epicondylitis, lateral, left  M77.12       3. Impingement of right shoulder  M25.811       4. Weakness of both upper extremities  R29.898       5. Impaired motor control  Z78.9          Surgery: n/a    Therapy precautions:None  Co-morbidities affecting plan of care: n/a    Chief complaints/history of injury: Patient reports onset of R medial elbow pain approximately 1 year ago. He did not have a specific TRENA, but recalls transitioning from playing golf outdoors on grass to a mat; he believes the different surface created more pressure when swinging through, as the mat resists the clubhead more. Since original onset, he attempted to adjust his wing, but has experienced onset of L lateral elbow pain and R anterior shoulder pain. There is minimal difficulty in ADLs, but it is affecting his ability to enjoy golf. He states he is planning to take the time to rest and rehab this winter so he can be fully healthy.    PN (1/9/24)  Nature of condition: Chronic (continuous duration > 3 months)  Describe current symptoms: B medial elbow soreness with loading    Pain Assessment:  Pain location: Patient denies pain, only mild soreness  Average Pain/symptom intensity (0-10 scale)  Last 24 hours: 1/10  Last week (1-7 days): 1/10  How often do you feel symptoms? Intermittently (0-25%)    How much have your symptoms interfered with daily activities? A little bit  How has your condition changed since receiving care at this facility? Much better  In general, would you say your current overall health is very good     Functional Outcome Measures: QuickDASH:  13.6/100= 13.6% functional deficit    Patient Stated Goals: alleviate symptoms and return to normal recreation    Initial

## 2024-02-05 ENCOUNTER — TREATMENT (OUTPATIENT)
Age: 64
End: 2024-02-05
Payer: MEDICARE

## 2024-02-05 DIAGNOSIS — M25.811 IMPINGEMENT OF RIGHT SHOULDER: ICD-10-CM

## 2024-02-05 DIAGNOSIS — M77.01 EPICONDYLITIS ELBOW, MEDIAL, RIGHT: Primary | ICD-10-CM

## 2024-02-05 DIAGNOSIS — Z78.9 IMPAIRED MOTOR CONTROL: ICD-10-CM

## 2024-02-05 DIAGNOSIS — M77.12 EPICONDYLITIS, LATERAL, LEFT: ICD-10-CM

## 2024-02-05 DIAGNOSIS — R29.898 WEAKNESS OF BOTH UPPER EXTREMITIES: ICD-10-CM

## 2024-02-05 PROCEDURE — 97530 THERAPEUTIC ACTIVITIES: CPT

## 2024-02-05 PROCEDURE — 97140 MANUAL THERAPY 1/> REGIONS: CPT

## 2024-02-05 PROCEDURE — 97110 THERAPEUTIC EXERCISES: CPT

## 2024-02-05 NOTE — PROGRESS NOTES
[facility] ORTHOPAED[facility]  [address] [facility] [address]  Dept: [phone]     Physical Therapy Daily Note     Referring MD: Friend, Carrie HARTMANN MD  Diagnosis:     ICD-10-CM    1. Epicondylitis elbow, medial, right  M77.01       2. Epicondylitis, lateral, left  M77.12       3. Impingement of right shoulder  M25.811       4. Weakness of both upper extremities  R29.898       5. Impaired motor control  Z78.9          Surgery: n/a    Therapy precautions:None  Co-morbidities affecting plan of care: n/a    Chief complaints/history of injury: Patient reports onset of R medial elbow pain approximately 1 year ago. He did not have a specific TRENA, but recalls transitioning from playing golf outdoors on grass to a mat; he believes the different surface created more pressure when swinging through, as the mat resists the clubhead more. Since original onset, he attempted to adjust his wing, but has experienced onset of L lateral elbow pain and R anterior shoulder pain. There is minimal difficulty in ADLs, but it is affecting his ability to enjoy golf. He states he is planning to take the time to rest and rehab this winter so he can be fully healthy.    PN (2/2/24)  Nature of condition: Chronic (continuous duration > 3 months)  Describe current symptoms: tenderness and tightness of B flexor mass    Pain Assessment:  Pain location: B flexor mass  Average Pain/symptom intensity (0-10 scale)  Last 24 hours: 1/10  Last week (1-7 days): 0/10  How often do you feel symptoms? Intermittently (0-25%)    How much have your symptoms interfered with daily activities? Not at all  How has your condition changed since receiving care at this facility? Much better  In general, would you say your current overall health is very good     Functional Outcome Measures: QuickDASH:  4.5/100= 4.5% functional deficit    Patient Stated Goals: alleviate symptoms and return to normal recreation    Initial Evaluation: 12/7/23  Last Progress

## 2024-02-13 ENCOUNTER — EVALUATION (OUTPATIENT)
Age: 64
End: 2024-02-13

## 2024-02-13 ENCOUNTER — TREATMENT (OUTPATIENT)
Age: 64
End: 2024-02-13

## 2024-02-13 DIAGNOSIS — M77.01 EPICONDYLITIS ELBOW, MEDIAL, RIGHT: Primary | ICD-10-CM

## 2024-02-13 DIAGNOSIS — M25.531 RIGHT WRIST PAIN: Primary | ICD-10-CM

## 2024-02-13 DIAGNOSIS — M77.12 EPICONDYLITIS, LATERAL, LEFT: ICD-10-CM

## 2024-02-13 DIAGNOSIS — R29.898 WEAKNESS OF BOTH UPPER EXTREMITIES: ICD-10-CM

## 2024-02-13 DIAGNOSIS — Z78.9 IMPAIRED MOTOR CONTROL: ICD-10-CM

## 2024-02-13 DIAGNOSIS — M25.811 IMPINGEMENT OF RIGHT SHOULDER: ICD-10-CM

## 2024-02-13 NOTE — PROGRESS NOTES
[facility] ORTHOPAED[facility]  [address] [facility] [address]  Dept: [phone]     Physical Therapy Daily Note     Referring MD: Friend, Carrie HARTMANN MD  Diagnosis:     ICD-10-CM    1. Epicondylitis elbow, medial, right  M77.01       2. Epicondylitis, lateral, left  M77.12       3. Impingement of right shoulder  M25.811       4. Weakness of both upper extremities  R29.898       5. Impaired motor control  Z78.9          Surgery: n/a    Therapy precautions:None  Co-morbidities affecting plan of care: n/a    Chief complaints/history of injury: Patient reports onset of R medial elbow pain approximately 1 year ago. He did not have a specific TRENA, but recalls transitioning from playing golf outdoors on grass to a mat; he believes the different surface created more pressure when swinging through, as the mat resists the clubhead more. Since original onset, he attempted to adjust his wing, but has experienced onset of L lateral elbow pain and R anterior shoulder pain. There is minimal difficulty in ADLs, but it is affecting his ability to enjoy golf. He states he is planning to take the time to rest and rehab this winter so he can be fully healthy.    PN (2/2/24)  Nature of condition: Chronic (continuous duration > 3 months)  Describe current symptoms: tenderness and tightness of B flexor mass    Pain Assessment:  Pain location: B flexor mass  Average Pain/symptom intensity (0-10 scale)  Last 24 hours: 1/10  Last week (1-7 days): 0/10  How often do you feel symptoms? Intermittently (0-25%)    How much have your symptoms interfered with daily activities? Not at all  How has your condition changed since receiving care at this facility? Much better  In general, would you say your current overall health is very good     Functional Outcome Measures: QuickDASH:  4.5/100= 4.5% functional deficit    Patient Stated Goals: alleviate symptoms and return to normal recreation    Initial Evaluation: 12/7/23  Last Progress

## 2024-02-14 NOTE — PROGRESS NOTES
Occupational Therapy Encounter No Charge    Patient seen briefly while in clinic this date due to recent injury to his wrist when swinging a golf club .  Pain over the radial wrist, scaphoid with deep palpation.  Mild, soreness versus severe ache.  Discomfort with weightbearing through the extremity.  Able to mobilize manually without increased discomfort.  No swelling or bruising present.        Precautions of current conditions and prognosis were also reviewed with the patient this date.  Patient in agreement with therapist recommendations of rest, thumb spica orthosis as needed.  Trial of taping for CMC and wrist stabilization.  If patient does not continue to improve, recommend he follow up with MD in no more than 2 weeks.

## 2024-02-15 ENCOUNTER — HOSPITAL ENCOUNTER (OUTPATIENT)
Dept: CT IMAGING | Age: 64
Discharge: HOME | End: 2024-02-15
Attending: STUDENT IN AN ORGANIZED HEALTH CARE EDUCATION/TRAINING PROGRAM
Payer: MEDICARE

## 2024-02-15 DIAGNOSIS — J32.9 RECURRENT RHINOSINUSITIS: ICD-10-CM

## 2024-02-15 PROCEDURE — 70486 CT MAXILLOFACIAL W/O DYE: CPT

## 2024-02-27 ENCOUNTER — TREATMENT (OUTPATIENT)
Age: 64
End: 2024-02-27
Payer: MEDICAID

## 2024-02-27 DIAGNOSIS — M25.811 IMPINGEMENT OF RIGHT SHOULDER: ICD-10-CM

## 2024-02-27 DIAGNOSIS — M77.01 EPICONDYLITIS ELBOW, MEDIAL, RIGHT: ICD-10-CM

## 2024-02-27 DIAGNOSIS — Z78.9 IMPAIRED MOTOR CONTROL: ICD-10-CM

## 2024-02-27 DIAGNOSIS — M77.12 EPICONDYLITIS, LATERAL, LEFT: ICD-10-CM

## 2024-02-27 DIAGNOSIS — R29.898 WEAKNESS OF BOTH UPPER EXTREMITIES: ICD-10-CM

## 2024-02-27 DIAGNOSIS — M25.531 WRIST PAIN, ACUTE, RIGHT: Primary | ICD-10-CM

## 2024-02-27 PROCEDURE — 97140 MANUAL THERAPY 1/> REGIONS: CPT

## 2024-02-27 NOTE — PROGRESS NOTES
[facility] ORTHOPAEDICS  [address] [facility] [address]  Dept: [phone]     Physical Therapy Discharge     Referring MD: Friend, Carrie HARTMANN MD  Diagnosis:     ICD-10-CM    1. Wrist pain, acute, right  M25.531       2. Epicondylitis elbow, medial, right  M77.01       3. Epicondylitis, lateral, left  M77.12       4. Impingement of right shoulder  M25.811       5. Weakness of both upper extremities  R29.898       6. Impaired motor control  Z78.9          Surgery: n/a    Therapy precautions:None  Co-morbidities affecting plan of care: n/a    Chief complaints/history of injury: Patient reports onset of R medial elbow pain approximately 1 year ago. He did not have a specific TRENA, but recalls transitioning from playing golf outdoors on grass to a mat; he believes the different surface created more pressure when swinging through, as the mat resists the clubhead more. Since original onset, he attempted to adjust his wing, but has experienced onset of L lateral elbow pain and R anterior shoulder pain. There is minimal difficulty in ADLs, but it is affecting his ability to enjoy golf. He states he is planning to take the time to rest and rehab this winter so he can be fully healthy.    PN (2/2/24)  Nature of condition: Chronic (continuous duration > 3 months)  Describe current symptoms: tenderness and tightness of B flexor mass    Pain Assessment:  Pain location: B flexor mass  Average Pain/symptom intensity (0-10 scale)  Last 24 hours: 1/10  Last week (1-7 days): 0/10  How often do you feel symptoms? Intermittently (0-25%)    How much have your symptoms interfered with daily activities? Not at all  How has your condition changed since receiving care at this facility? Much better  In general, would you say your current overall health is very good     Functional Outcome Measures: QuickDASH:  4.5/100= 4.5% functional deficit    Patient Stated Goals: alleviate symptoms and return to normal

## 2024-02-29 ENCOUNTER — OFFICE VISIT (OUTPATIENT)
Dept: ORTHOPEDIC SURGERY | Age: 64
End: 2024-02-29
Payer: MEDICAID

## 2024-02-29 DIAGNOSIS — M25.531 RIGHT WRIST PAIN: Primary | ICD-10-CM

## 2024-02-29 PROCEDURE — 1123F ACP DISCUSS/DSCN MKR DOCD: CPT | Performed by: ORTHOPAEDIC SURGERY

## 2024-02-29 PROCEDURE — 99213 OFFICE O/P EST LOW 20 MIN: CPT | Performed by: ORTHOPAEDIC SURGERY

## 2024-02-29 NOTE — PROGRESS NOTES
Orthopaedic Hand Clinic Note    Name: Ishan Faye  YOB: 1960  Gender: male  MRN: [id number]      Follow up visit:   1. Right wrist pain        HPI: Ishan Faye is a 63 y.o. male who is following up with a new injury to the right wrist which occurred when he struck the ground while hitting golfballs. He experienced mild pain at the time, and was able to continue hitting balls. Pain worsened the following day. He has been using ice and a brace.      ROS/Meds/PSH/PMH/FH/SH: I personally reviewed the patients standard intake form.  Pertinents are discussed in the HPI    Physical Examination:    Musculoskeletal Examination:  Examination on the right upper extremity demonstrates cap refill < 5 seconds in all fingers, there is no swelling or ecchymosis. Point of maximal tenderness is at the dorsal base of the second metacarpal. There is mild tenderness at the anatomic snuffbox and over the first dorsal compartment. There is no pain at the distal pole of the scaphoid or over the FCR. There is mild pain with wrist motion. Leung test is negative.    Imaging / Electrodiagnostic Tests:     Wrist XR: AP, Lateral, Oblique views     Clinical Indication:  1. Right wrist pain           Report: AP, lateral, oblique x-ray of the right wrist demonstrates no bony abnormalities    Impression: as above     Carrie Hernandez MD         Assessment:     ICD-10-CM    1. Right wrist pain  M25.531 XR WRIST RIGHT (MIN 3 VIEWS)          Plan:   We discussed the diagnosis and different treatment options. We discussed observation, therapy, antiinflammatory medications and other pertinent treatment modalities.    After discussing in detail the patient elects to proceed with brace, NSAIDs. He should avoid strenuous activity/golfing until pain resolves. He will follow up if pain persists for more than another month..     Patient voiced accordance and understanding of the information provided and the formulated plan. All questions were

## 2024-04-23 ENCOUNTER — OFFICE VISIT (OUTPATIENT)
Dept: INTERNAL MEDICINE CLINIC | Facility: CLINIC | Age: 64
End: 2024-04-23
Payer: MEDICARE

## 2024-04-23 VITALS
HEIGHT: 65 IN | OXYGEN SATURATION: 100 % | DIASTOLIC BLOOD PRESSURE: 59 MMHG | HEART RATE: 64 BPM | SYSTOLIC BLOOD PRESSURE: 121 MMHG | WEIGHT: 139 LBS | TEMPERATURE: 97.4 F | BODY MASS INDEX: 23.16 KG/M2

## 2024-04-23 DIAGNOSIS — E78.2 MIXED HYPERLIPIDEMIA: ICD-10-CM

## 2024-04-23 DIAGNOSIS — I25.84 CORONARY ARTERY DISEASE DUE TO CALCIFIED CORONARY LESION: ICD-10-CM

## 2024-04-23 DIAGNOSIS — I25.10 CORONARY ARTERY DISEASE DUE TO CALCIFIED CORONARY LESION: ICD-10-CM

## 2024-04-23 DIAGNOSIS — J32.4 CHRONIC PANSINUSITIS: ICD-10-CM

## 2024-04-23 DIAGNOSIS — J30.9 ALLERGIC RHINITIS, UNSPECIFIED SEASONALITY, UNSPECIFIED TRIGGER: ICD-10-CM

## 2024-04-23 DIAGNOSIS — Z00.00 MEDICARE WELCOME EXAM: Primary | ICD-10-CM

## 2024-04-23 PROCEDURE — 1123F ACP DISCUSS/DSCN MKR DOCD: CPT | Performed by: INTERNAL MEDICINE

## 2024-04-23 PROCEDURE — G0402 INITIAL PREVENTIVE EXAM: HCPCS | Performed by: INTERNAL MEDICINE

## 2024-04-23 PROCEDURE — 99214 OFFICE O/P EST MOD 30 MIN: CPT | Performed by: INTERNAL MEDICINE

## 2024-04-23 PROCEDURE — 93000 ELECTROCARDIOGRAM COMPLETE: CPT | Performed by: INTERNAL MEDICINE

## 2024-04-23 RX ORDER — MONTELUKAST SODIUM 10 MG/1
10 TABLET ORAL DAILY
Qty: 90 TABLET | Refills: 1 | Status: SHIPPED | OUTPATIENT
Start: 2024-04-23

## 2024-04-23 RX ORDER — TERBINAFINE HYDROCHLORIDE 250 MG/1
250 TABLET ORAL DAILY
COMMUNITY
Start: 2024-02-05

## 2024-04-23 SDOH — ECONOMIC STABILITY: FOOD INSECURITY: WITHIN THE PAST 12 MONTHS, YOU WORRIED THAT YOUR FOOD WOULD RUN OUT BEFORE YOU GOT MONEY TO BUY MORE.: NEVER TRUE

## 2024-04-23 SDOH — ECONOMIC STABILITY: FOOD INSECURITY: WITHIN THE PAST 12 MONTHS, THE FOOD YOU BOUGHT JUST DIDN'T LAST AND YOU DIDN'T HAVE MONEY TO GET MORE.: NEVER TRUE

## 2024-04-23 SDOH — ECONOMIC STABILITY: INCOME INSECURITY: HOW HARD IS IT FOR YOU TO PAY FOR THE VERY BASICS LIKE FOOD, HOUSING, MEDICAL CARE, AND HEATING?: NOT HARD AT ALL

## 2024-04-23 ASSESSMENT — ANXIETY QUESTIONNAIRES
2. NOT BEING ABLE TO STOP OR CONTROL WORRYING: NOT AT ALL
3. WORRYING TOO MUCH ABOUT DIFFERENT THINGS: NOT AT ALL
IF YOU CHECKED OFF ANY PROBLEMS ON THIS QUESTIONNAIRE, HOW DIFFICULT HAVE THESE PROBLEMS MADE IT FOR YOU TO DO YOUR WORK, TAKE CARE OF THINGS AT HOME, OR GET ALONG WITH OTHER PEOPLE: NOT DIFFICULT AT ALL
5. BEING SO RESTLESS THAT IT IS HARD TO SIT STILL: NOT AT ALL
7. FEELING AFRAID AS IF SOMETHING AWFUL MIGHT HAPPEN: NOT AT ALL
6. BECOMING EASILY ANNOYED OR IRRITABLE: NOT AT ALL
1. FEELING NERVOUS, ANXIOUS, OR ON EDGE: NOT AT ALL
4. TROUBLE RELAXING: NOT AT ALL
GAD7 TOTAL SCORE: 0

## 2024-04-23 ASSESSMENT — LIFESTYLE VARIABLES
HOW OFTEN DO YOU HAVE A DRINK CONTAINING ALCOHOL: NEVER
HOW MANY STANDARD DRINKS CONTAINING ALCOHOL DO YOU HAVE ON A TYPICAL DAY: PATIENT DOES NOT DRINK

## 2024-04-23 ASSESSMENT — PATIENT HEALTH QUESTIONNAIRE - PHQ9
2. FEELING DOWN, DEPRESSED OR HOPELESS: NOT AT ALL
SUM OF ALL RESPONSES TO PHQ QUESTIONS 1-9: 0
SUM OF ALL RESPONSES TO PHQ9 QUESTIONS 1 & 2: 0
SUM OF ALL RESPONSES TO PHQ QUESTIONS 1-9: 0
1. LITTLE INTEREST OR PLEASURE IN DOING THINGS: NOT AT ALL

## 2024-04-23 NOTE — PROGRESS NOTES
MD MARY KATE as PCP - Empaneled Provider  Ludivina Roberto DO (Otolaryngology)     Reviewed and updated this visit:  Tobacco  Allergies  Meds  Problems  Med Hx  Surg Hx  Soc Hx  Fam Hx

## 2024-04-23 NOTE — PATIENT INSTRUCTIONS
in one or both shoulders or arms.     Lightheadedness or sudden weakness.     A fast or irregular heartbeat.   After you call 911, the  may tell you to chew 1 adult-strength or 2 to 4 low-dose aspirin. Wait for an ambulance. Do not try to drive yourself.  Watch closely for changes in your health, and be sure to contact your doctor if you have any problems.  Where can you learn more?  Go to https://www.[employer].net/patientEd and enter F075 to learn more about \"A Healthy Heart: Care Instructions.\"  Current as of: June 24, 2023               Content Version: 14.0  © 7159-5222 [employer].   Care instructions adapted under license by [employer]. If you have questions about a medical condition or this instruction, always ask your healthcare professional. [employer] disclaims any warranty or liability for your use of this information.      Personalized Preventive Plan for Ishan Faye - 4/23/2024  Medicare offers a range of preventive health benefits. Some of the tests and screenings are paid in full while other may be subject to a deductible, co-insurance, and/or copay.    Some of these benefits include a comprehensive review of your medical history including lifestyle, illnesses that may run in your family, and various assessments and screenings as appropriate.    After reviewing your medical record and screening and assessments performed today your provider may have ordered immunizations, labs, imaging, and/or referrals for you.  A list of these orders (if applicable) as well as your Preventive Care list are included within your After Visit Summary for your review.    Other Preventive Recommendations:    A preventive eye exam performed by an eye specialist is recommended every 1-2 years to screen for glaucoma; cataracts, macular degeneration, and other eye disorders.  A preventive dental visit is recommended every 6 months.  Try to get at least 150 minutes of exercise per week or

## 2024-04-24 LAB
ALBUMIN SERPL-MCNC: 4.8 G/DL (ref 3.2–4.6)
ALBUMIN/GLOB SERPL: 2 (ref 1–1.9)
ALP SERPL-CCNC: 82 U/L (ref 40–129)
ALT SERPL-CCNC: 24 U/L (ref 12–65)
ANION GAP SERPL CALC-SCNC: 10 MMOL/L (ref 9–18)
APPEARANCE UR: CLEAR
AST SERPL-CCNC: 27 U/L (ref 15–37)
BASOPHILS # BLD: 0.1 K/UL (ref 0–0.2)
BASOPHILS NFR BLD: 1 % (ref 0–2)
BILIRUB DIRECT SERPL-MCNC: <0.2 MG/DL (ref 0–0.4)
BILIRUB SERPL-MCNC: 0.4 MG/DL (ref 0–1.2)
BILIRUB UR QL: NEGATIVE
BUN SERPL-MCNC: 15 MG/DL (ref 8–23)
CALCIUM SERPL-MCNC: 9.7 MG/DL (ref 8.8–10.2)
CHLORIDE SERPL-SCNC: 102 MMOL/L (ref 98–107)
CHOLEST SERPL-MCNC: 233 MG/DL (ref 0–200)
CO2 SERPL-SCNC: 25 MMOL/L (ref 20–28)
COLOR UR: NORMAL
CREAT SERPL-MCNC: 1.27 MG/DL (ref 0.8–1.3)
DIFFERENTIAL METHOD BLD: NORMAL
EOSINOPHIL # BLD: 0.5 K/UL (ref 0–0.8)
EOSINOPHIL NFR BLD: 7 % (ref 0.5–7.8)
ERYTHROCYTE [DISTWIDTH] IN BLOOD BY AUTOMATED COUNT: 13 % (ref 11.9–14.6)
GLOBULIN SER CALC-MCNC: 2.4 G/DL (ref 2.3–3.5)
GLUCOSE SERPL-MCNC: 95 MG/DL (ref 70–99)
GLUCOSE UR STRIP.AUTO-MCNC: NEGATIVE MG/DL
HCT VFR BLD AUTO: 43.4 % (ref 41.1–50.3)
HDLC SERPL-MCNC: 53 MG/DL (ref 40–60)
HDLC SERPL: 4.4 (ref 0–5)
HGB BLD-MCNC: 14.6 G/DL (ref 13.6–17.2)
HGB UR QL STRIP: NEGATIVE
IMM GRANULOCYTES # BLD AUTO: 0 K/UL (ref 0–0.5)
IMM GRANULOCYTES NFR BLD AUTO: 0 % (ref 0–5)
KETONES UR QL STRIP.AUTO: NEGATIVE MG/DL
LDLC SERPL CALC-MCNC: 156 MG/DL (ref 0–100)
LEUKOCYTE ESTERASE UR QL STRIP.AUTO: NEGATIVE
LYMPHOCYTES # BLD: 1.8 K/UL (ref 0.5–4.6)
LYMPHOCYTES NFR BLD: 24 % (ref 13–44)
MCH RBC QN AUTO: 31.3 PG (ref 26.1–32.9)
MCHC RBC AUTO-ENTMCNC: 33.6 G/DL (ref 31.4–35)
MCV RBC AUTO: 93.1 FL (ref 82–102)
MONOCYTES # BLD: 0.7 K/UL (ref 0.1–1.3)
MONOCYTES NFR BLD: 10 % (ref 4–12)
NEUTS SEG # BLD: 4.3 K/UL (ref 1.7–8.2)
NEUTS SEG NFR BLD: 58 % (ref 43–78)
NITRITE UR QL STRIP.AUTO: NEGATIVE
NRBC # BLD: 0 K/UL (ref 0–0.2)
PH UR STRIP: 5.5 (ref 5–9)
PLATELET # BLD AUTO: 230 K/UL (ref 150–450)
PMV BLD AUTO: 10.7 FL (ref 9.4–12.3)
POTASSIUM SERPL-SCNC: 4.6 MMOL/L (ref 3.5–5.1)
PROT SERPL-MCNC: 7.2 G/DL (ref 6.3–8.2)
PROT UR STRIP-MCNC: NEGATIVE MG/DL
RBC # BLD AUTO: 4.66 M/UL (ref 4.23–5.6)
SODIUM SERPL-SCNC: 137 MMOL/L (ref 136–145)
SP GR UR REFRACTOMETRY: 1.01 (ref 1–1.02)
TRIGL SERPL-MCNC: 124 MG/DL (ref 0–150)
UROBILINOGEN UR QL STRIP.AUTO: 0.2 EU/DL (ref 0.2–1)
VLDLC SERPL CALC-MCNC: 25 MG/DL (ref 6–23)
WBC # BLD AUTO: 7.4 K/UL (ref 4.3–11.1)

## 2024-09-05 ENCOUNTER — OFFICE VISIT (OUTPATIENT)
Dept: INTERNAL MEDICINE CLINIC | Facility: CLINIC | Age: 64
End: 2024-09-05
Payer: MEDICARE

## 2024-09-05 VITALS
HEART RATE: 96 BPM | BODY MASS INDEX: 22.16 KG/M2 | TEMPERATURE: 98.1 F | RESPIRATION RATE: 16 BRPM | WEIGHT: 133 LBS | SYSTOLIC BLOOD PRESSURE: 134 MMHG | OXYGEN SATURATION: 97 % | DIASTOLIC BLOOD PRESSURE: 78 MMHG | HEIGHT: 65 IN

## 2024-09-05 DIAGNOSIS — J30.9 ALLERGIC RHINITIS, UNSPECIFIED SEASONALITY, UNSPECIFIED TRIGGER: ICD-10-CM

## 2024-09-05 DIAGNOSIS — J01.40 ACUTE NON-RECURRENT PANSINUSITIS: Primary | ICD-10-CM

## 2024-09-05 DIAGNOSIS — R19.00 ABDOMINAL MASS, UNSPECIFIED ABDOMINAL LOCATION: ICD-10-CM

## 2024-09-05 PROCEDURE — 99214 OFFICE O/P EST MOD 30 MIN: CPT | Performed by: INTERNAL MEDICINE

## 2024-09-05 PROCEDURE — 1123F ACP DISCUSS/DSCN MKR DOCD: CPT | Performed by: INTERNAL MEDICINE

## 2024-09-05 RX ORDER — AZITHROMYCIN 250 MG/1
TABLET, FILM COATED ORAL
Qty: 6 TABLET | Refills: 0 | Status: SHIPPED | OUTPATIENT
Start: 2024-09-05 | End: 2024-09-15

## 2024-09-05 RX ORDER — MONTELUKAST SODIUM 10 MG/1
10 TABLET ORAL DAILY
Qty: 90 TABLET | Refills: 0 | Status: SHIPPED | OUTPATIENT
Start: 2024-09-05

## 2024-09-05 RX ORDER — METHYLPREDNISOLONE 4 MG
TABLET, DOSE PACK ORAL
Qty: 21 TABLET | Refills: 0 | Status: SHIPPED | OUTPATIENT
Start: 2024-09-05 | End: 2024-09-11

## 2024-09-05 ASSESSMENT — ENCOUNTER SYMPTOMS
RHINORRHEA: 0
COUGH: 1
SORE THROAT: 1
SHORTNESS OF BREATH: 0

## 2024-09-05 NOTE — PROGRESS NOTES
Tan Byrd M.D.  [place] [address]  Phone: [phone]  Fax: [phone]    Cough  This is a new problem. The current episode started in the past 7 days (6 days ago.). The problem has been unchanged. The problem occurs constantly. The cough is Non-productive. Associated symptoms include nasal congestion, postnasal drip and a sore throat. Pertinent negatives include no chest pain, chills, fever, rhinorrhea or shortness of breath. Nothing aggravates the symptoms. Treatments tried: Singulair. Floanse.  Xyzal. The treatment provided no relief. Allergic rhinitis.        Ishan Faye is a 63 y.o. White (non-) male.     Current Outpatient Medications   Medication Sig Dispense Refill    montelukast (SINGULAIR) 10 MG tablet Take 1 tablet by mouth daily 90 tablet 1    rosuvastatin (CRESTOR) 40 MG tablet Take 1 tablet by mouth daily 90 tablet 1    fluticasone (FLONASE) 50 MCG/ACT nasal spray 1 spray by Each Nostril route daily      levocetirizine (XYZAL) 5 MG tablet Take 1 tablet by mouth nightly       No current facility-administered medications for this visit.     Allergies   Allergen Reactions    Penicillins Hives and Other (See Comments)    Sulfa Antibiotics Hives and Other (See Comments)     Past Medical History:   Diagnosis Date    Allergic rhinitis     Fracture of nasal bone My nose was hit first when I was 11 and again when I was 47 causing a severe deviated septum.    HLD (hyperlipidemia)      History reviewed. No pertinent surgical history.  Social History     Tobacco Use    Smoking status: Never    Smokeless tobacco: Never    Tobacco comments:     Only smoked a few cigarettes in my life before turning 16   Vaping Use    Vaping status: Never Used   Substance Use Topics    Alcohol use: Never    Drug use: Never     Family History   Problem Relation Age of Onset    Diabetes Mother     Heart Murmur Father     Liver Cancer Father     Cancer

## 2024-10-29 ENCOUNTER — OFFICE VISIT (OUTPATIENT)
Dept: INTERNAL MEDICINE CLINIC | Facility: CLINIC | Age: 64
End: 2024-10-29

## 2024-10-29 VITALS
HEIGHT: 65 IN | BODY MASS INDEX: 22.63 KG/M2 | HEART RATE: 89 BPM | OXYGEN SATURATION: 97 % | WEIGHT: 135.8 LBS | TEMPERATURE: 97.5 F | DIASTOLIC BLOOD PRESSURE: 66 MMHG | SYSTOLIC BLOOD PRESSURE: 100 MMHG

## 2024-10-29 DIAGNOSIS — E78.2 MIXED HYPERLIPIDEMIA: ICD-10-CM

## 2024-10-29 DIAGNOSIS — I25.84 CORONARY ARTERY DISEASE DUE TO CALCIFIED CORONARY LESION: Primary | ICD-10-CM

## 2024-10-29 DIAGNOSIS — I25.10 CORONARY ARTERY DISEASE DUE TO CALCIFIED CORONARY LESION: Primary | ICD-10-CM

## 2024-10-29 DIAGNOSIS — I25.10 CORONARY ARTERY DISEASE DUE TO CALCIFIED CORONARY LESION: ICD-10-CM

## 2024-10-29 DIAGNOSIS — Z12.5 PROSTATE CANCER SCREENING: ICD-10-CM

## 2024-10-29 DIAGNOSIS — J30.9 ALLERGIC RHINITIS, UNSPECIFIED SEASONALITY, UNSPECIFIED TRIGGER: ICD-10-CM

## 2024-10-29 DIAGNOSIS — I25.84 CORONARY ARTERY DISEASE DUE TO CALCIFIED CORONARY LESION: ICD-10-CM

## 2024-10-29 LAB
ALBUMIN SERPL-MCNC: 4.4 G/DL (ref 3.2–4.6)
ALBUMIN/GLOB SERPL: 1.5 (ref 1–1.9)
ALP SERPL-CCNC: 80 U/L (ref 40–129)
ALT SERPL-CCNC: 21 U/L (ref 8–55)
ANION GAP SERPL CALC-SCNC: 12 MMOL/L (ref 7–16)
AST SERPL-CCNC: 31 U/L (ref 15–37)
BASOPHILS # BLD: 0.1 K/UL (ref 0–0.2)
BASOPHILS NFR BLD: 2 % (ref 0–2)
BILIRUB DIRECT SERPL-MCNC: <0.2 MG/DL (ref 0–0.4)
BILIRUB SERPL-MCNC: 0.6 MG/DL (ref 0–1.2)
BUN SERPL-MCNC: 16 MG/DL (ref 8–23)
CALCIUM SERPL-MCNC: 9.6 MG/DL (ref 8.8–10.2)
CHLORIDE SERPL-SCNC: 104 MMOL/L (ref 98–107)
CHOLEST SERPL-MCNC: 238 MG/DL (ref 0–200)
CO2 SERPL-SCNC: 24 MMOL/L (ref 20–29)
CREAT SERPL-MCNC: 1.33 MG/DL (ref 0.8–1.3)
DIFFERENTIAL METHOD BLD: NORMAL
EOSINOPHIL # BLD: 0.4 K/UL (ref 0–0.8)
EOSINOPHIL NFR BLD: 6 % (ref 0.5–7.8)
ERYTHROCYTE [DISTWIDTH] IN BLOOD BY AUTOMATED COUNT: 13 % (ref 11.9–14.6)
GLOBULIN SER CALC-MCNC: 2.9 G/DL (ref 2.3–3.5)
GLUCOSE SERPL-MCNC: 105 MG/DL (ref 70–99)
HCT VFR BLD AUTO: 43.1 % (ref 41.1–50.3)
HDLC SERPL-MCNC: 56 MG/DL (ref 40–60)
HDLC SERPL: 4.2 (ref 0–5)
HGB BLD-MCNC: 14.7 G/DL (ref 13.6–17.2)
IMM GRANULOCYTES # BLD AUTO: 0 K/UL (ref 0–0.5)
IMM GRANULOCYTES NFR BLD AUTO: 0 % (ref 0–5)
LDLC SERPL CALC-MCNC: 126 MG/DL (ref 0–100)
LYMPHOCYTES # BLD: 1.7 K/UL (ref 0.5–4.6)
LYMPHOCYTES NFR BLD: 28 % (ref 13–44)
MCH RBC QN AUTO: 30.8 PG (ref 26.1–32.9)
MCHC RBC AUTO-ENTMCNC: 34.1 G/DL (ref 31.4–35)
MCV RBC AUTO: 90.4 FL (ref 82–102)
MONOCYTES # BLD: 0.5 K/UL (ref 0.1–1.3)
MONOCYTES NFR BLD: 8 % (ref 4–12)
NEUTS SEG # BLD: 3.4 K/UL (ref 1.7–8.2)
NEUTS SEG NFR BLD: 56 % (ref 43–78)
NRBC # BLD: 0 K/UL (ref 0–0.2)
PLATELET # BLD AUTO: 230 K/UL (ref 150–450)
PMV BLD AUTO: 10.4 FL (ref 9.4–12.3)
POTASSIUM SERPL-SCNC: 4.8 MMOL/L (ref 3.5–5.1)
PROT SERPL-MCNC: 7.3 G/DL (ref 6.3–8.2)
PSA SERPL-MCNC: 0.5 NG/ML (ref 0–4)
RBC # BLD AUTO: 4.77 M/UL (ref 4.23–5.6)
SODIUM SERPL-SCNC: 139 MMOL/L (ref 136–145)
TRIGL SERPL-MCNC: 278 MG/DL (ref 0–150)
TSH, 3RD GENERATION: 1.8 UIU/ML (ref 0.27–4.2)
VLDLC SERPL CALC-MCNC: 56 MG/DL (ref 6–23)
WBC # BLD AUTO: 6.2 K/UL (ref 4.3–11.1)

## 2024-10-29 RX ORDER — MONTELUKAST SODIUM 10 MG/1
10 TABLET ORAL DAILY
Qty: 90 TABLET | Refills: 1 | Status: SHIPPED | OUTPATIENT
Start: 2024-10-29

## 2024-10-29 RX ORDER — ROSUVASTATIN CALCIUM 40 MG/1
40 TABLET, COATED ORAL DAILY
Qty: 90 TABLET | Refills: 1 | Status: CANCELLED | OUTPATIENT
Start: 2024-10-29

## 2024-10-29 ASSESSMENT — PATIENT HEALTH QUESTIONNAIRE - PHQ9
2. FEELING DOWN, DEPRESSED OR HOPELESS: NOT AT ALL
SUM OF ALL RESPONSES TO PHQ QUESTIONS 1-9: 0
1. LITTLE INTEREST OR PLEASURE IN DOING THINGS: NOT AT ALL
SUM OF ALL RESPONSES TO PHQ9 QUESTIONS 1 & 2: 0
SUM OF ALL RESPONSES TO PHQ QUESTIONS 1-9: 0

## 2024-10-29 NOTE — ACP (ADVANCE CARE PLANNING)
Advance Care Planning   The patient has the following advanced directives on file:  Advance Directives       Power of  Living Will ACP-Advance Directive ACP-Power of     Not on File Not on File Not on File Not on File            The patient has appointed the following active healthcare agents:    Primary Decision Maker: Leann Faye - Brother/Sister - [phone]    The Patient has the following current code status:    Code Status: Not on file    Visit Documentation:  I discussed Advance Care Planning with Ishan Faye today which included the importance of making their choices for care and treatment in the case of a health event that adversely affects their decision-making abilities. He has not completed the Advance Care Directives. He does not have an active health care agent at this time.  Ishan Faye was encouraged to complete the declaration forms and provide a signed copy of his medical records.  I advised patient we would continue this discussion at future visits.     Norberto Abreu  10/29/2024

## 2024-10-29 NOTE — PROGRESS NOTES
this visit:    Coronary artery disease due to calcified coronary lesion  -     Basic Metabolic Panel; Future  -     CBC with Auto Differential; Future  -     Lipid Panel; Future  -     Hepatic Function Panel; Future  -     TSH; Future  -     Urinalysis; Future    Mixed hyperlipidemia  -     Basic Metabolic Panel; Future  -     CBC with Auto Differential; Future  -     Lipid Panel; Future  -     Hepatic Function Panel; Future  -     TSH; Future  -     Urinalysis; Future    Allergic rhinitis, unspecified seasonality, unspecified trigger  -     montelukast (SINGULAIR) 10 MG tablet; Take 1 tablet by mouth daily    Prostate cancer screening  -     PSA Screening; Future      Return in about 6 months (around 4/29/2025).

## 2024-10-30 LAB
EST. AVERAGE GLUCOSE BLD GHB EST-MCNC: 110 MG/DL
EST. AVERAGE GLUCOSE BLD GHB EST-MCNC: 110 MG/DL
HBA1C MFR BLD: 5.5 % (ref 0–5.6)
HBA1C MFR BLD: 5.5 % (ref 0–5.6)

## 2024-11-18 ENCOUNTER — PATIENT MESSAGE (OUTPATIENT)
Dept: INTERNAL MEDICINE CLINIC | Facility: CLINIC | Age: 64
End: 2024-11-18

## 2024-11-18 DIAGNOSIS — R33.9 URINARY RETENTION: Primary | ICD-10-CM

## 2024-11-18 DIAGNOSIS — R93.5 ABNORMAL ABDOMINAL ULTRASOUND: ICD-10-CM

## 2024-11-18 DIAGNOSIS — R10.84 GENERALIZED ABDOMINAL PAIN: ICD-10-CM

## 2024-11-18 DIAGNOSIS — R19.00 ABDOMINAL MASS, UNSPECIFIED ABDOMINAL LOCATION: ICD-10-CM

## 2024-11-18 NOTE — TELEPHONE ENCOUNTER
Sent pt a my chart message with abd u/s results and recommendations. Referral and CT Urogram ordered. Ty

## 2024-11-19 ENCOUNTER — OFFICE VISIT (OUTPATIENT)
Dept: UROLOGY | Age: 64
End: 2024-11-19
Payer: MEDICARE

## 2024-11-19 DIAGNOSIS — R33.9 URINE RETENTION: ICD-10-CM

## 2024-11-19 DIAGNOSIS — N40.1 BPH WITH OBSTRUCTION/LOWER URINARY TRACT SYMPTOMS: Primary | ICD-10-CM

## 2024-11-19 DIAGNOSIS — N13.8 BPH WITH OBSTRUCTION/LOWER URINARY TRACT SYMPTOMS: Primary | ICD-10-CM

## 2024-11-19 LAB
BILIRUBIN, URINE, POC: NEGATIVE
BLOOD URINE, POC: NEGATIVE
GLUCOSE URINE, POC: NEGATIVE MG/DL
KETONES, URINE, POC: NEGATIVE MG/DL
LEUKOCYTE ESTERASE, URINE, POC: NEGATIVE
NITRITE, URINE, POC: NEGATIVE
PH, URINE, POC: 5.5 (ref 4.6–8)
PROTEIN,URINE, POC: NEGATIVE MG/DL
PVR, POC: 1363 CC
SPECIFIC GRAVITY, URINE, POC: 1.03 (ref 1–1.03)
URINALYSIS CLARITY, POC: NORMAL
URINALYSIS COLOR, POC: NORMAL
UROBILINOGEN, POC: NORMAL MG/DL

## 2024-11-19 PROCEDURE — 81003 URINALYSIS AUTO W/O SCOPE: CPT | Performed by: NURSE PRACTITIONER

## 2024-11-19 PROCEDURE — 1159F MED LIST DOCD IN RCRD: CPT | Performed by: NURSE PRACTITIONER

## 2024-11-19 PROCEDURE — 51798 US URINE CAPACITY MEASURE: CPT | Performed by: NURSE PRACTITIONER

## 2024-11-19 PROCEDURE — 99204 OFFICE O/P NEW MOD 45 MIN: CPT | Performed by: NURSE PRACTITIONER

## 2024-11-19 PROCEDURE — 1160F RVW MEDS BY RX/DR IN RCRD: CPT | Performed by: NURSE PRACTITIONER

## 2024-11-19 PROCEDURE — 1123F ACP DISCUSS/DSCN MKR DOCD: CPT | Performed by: NURSE PRACTITIONER

## 2024-11-19 RX ORDER — TAMSULOSIN HYDROCHLORIDE 0.4 MG/1
0.4 CAPSULE ORAL DAILY
Qty: 90 CAPSULE | Refills: 1 | Status: SHIPPED | OUTPATIENT
Start: 2024-11-19

## 2024-11-19 ASSESSMENT — ENCOUNTER SYMPTOMS: BACK PAIN: 0

## 2024-11-19 NOTE — PROGRESS NOTES
[facility]  [address]  [place] [address]  [phone]          Ishan Faye  : 1960    Chief Complaint   Patient presents with    Urinary Tract Infection    New Patient          HPI     Ishan Faye is a 64 y.o. male w hx of CAR referred for urine retention. He was seen by PCP in  w c/o abd fullness. He was sent for abd u/s in  showing palpable mass 2/2 to distended bladder w volume of 1391 cc w recs for CT. This has been ordered by Dr. Byrd.    He notes issues emptying bladder for quite some time; dating back as far as . Hx of severe allergies. He is currently on xyzal and sudafed TID. Hx of post op retention 6-7 yrs ago after inguinal hernia repair requiring catheter. Reports UU/UF. No incontinence. Had RUQ abd pain last couple of days; this has subsided. PVR today is 1363 cc via u/s. No c/o pain w this. Denies recurrent UTI.     Lab Results   Component Value Date    PSA 0.5 10/29/2024    PSA 0.5 10/19/2023       Cr 1.33.          Past Medical History:   Diagnosis Date    Agatston coronary artery calcium score between 100 and 199     Allergic rhinitis     Fracture of nasal bone     HLD (hyperlipidemia)      Past Surgical History:   Procedure Laterality Date    COSMETIC SURGERY  1984    Chin implant    EYE SURGERY  2007    Lasik    HERNIA REPAIR  Sep. 2015    Groin area     Current Outpatient Medications   Medication Sig Dispense Refill    tamsulosin (FLOMAX) 0.4 MG capsule Take 1 capsule by mouth daily 90 capsule 1    montelukast (SINGULAIR) 10 MG tablet Take 1 tablet by mouth daily 90 tablet 1    rosuvastatin (CRESTOR) 40 MG tablet Take 1 tablet by mouth daily 90 tablet 1    fluticasone (FLONASE) 50 MCG/ACT nasal spray 1 spray by Each Nostril route daily      levocetirizine (XYZAL) 5 MG tablet Take 1 tablet by mouth nightly       No current facility-administered medications for this visit.     Allergies   Allergen Reactions    Penicillins

## 2024-11-29 ENCOUNTER — TELEPHONE (OUTPATIENT)
Dept: UROLOGY | Age: 64
End: 2024-11-29

## 2024-11-29 NOTE — TELEPHONE ENCOUNTER
Pt feel that he might have uti. Wanted appt today. Let pt know there is no provider here and he would need to go to an urg care or ER if have sx of chills, fever or can not urinate.  Pt explained flomax is helping with urination.

## 2024-12-05 ENCOUNTER — OFFICE VISIT (OUTPATIENT)
Dept: UROLOGY | Age: 64
End: 2024-12-05
Payer: MEDICARE

## 2024-12-05 ENCOUNTER — TELEPHONE (OUTPATIENT)
Dept: UROLOGY | Age: 64
End: 2024-12-05

## 2024-12-05 DIAGNOSIS — N40.1 BPH WITH OBSTRUCTION/LOWER URINARY TRACT SYMPTOMS: ICD-10-CM

## 2024-12-05 DIAGNOSIS — R33.9 URINE RETENTION: Primary | ICD-10-CM

## 2024-12-05 DIAGNOSIS — N13.8 BPH WITH OBSTRUCTION/LOWER URINARY TRACT SYMPTOMS: ICD-10-CM

## 2024-12-05 LAB — PVR, POC: 1209 CC

## 2024-12-05 PROCEDURE — 51798 US URINE CAPACITY MEASURE: CPT | Performed by: NURSE PRACTITIONER

## 2024-12-05 PROCEDURE — 1160F RVW MEDS BY RX/DR IN RCRD: CPT | Performed by: NURSE PRACTITIONER

## 2024-12-05 PROCEDURE — 1123F ACP DISCUSS/DSCN MKR DOCD: CPT | Performed by: NURSE PRACTITIONER

## 2024-12-05 PROCEDURE — 99214 OFFICE O/P EST MOD 30 MIN: CPT | Performed by: NURSE PRACTITIONER

## 2024-12-05 PROCEDURE — 1159F MED LIST DOCD IN RCRD: CPT | Performed by: NURSE PRACTITIONER

## 2024-12-05 ASSESSMENT — ENCOUNTER SYMPTOMS: BACK PAIN: 0

## 2024-12-05 NOTE — PROGRESS NOTES
[facility]  [address]  [place] [address]  [phone]          Ishan Faye  : 1960    Chief Complaint   Patient presents with    Follow-up    Urinary Retention          HPI     Ishan Faye is a 64 y.o. male  w hx of CAD referred for urine retention. He was seen by PCP in  w c/o abd fullness. He was sent for abd u/s in  showing palpable mass 2/2 to distended bladder w volume of 1391 cc w recs for CT. This is scheduled 24.      He notes issues emptying bladder for quite some time; dating back as far as 16. Hx of severe allergies. He is currently on xyzal and sudafed TID. Hx of post op retention 6-7 yrs ago after inguinal hernia repair requiring catheter. Reports UU/UF. No incontinence. Had RUQ abd pain last couple of days; this has subsided. PVR 1363 cc via u/s 24. No c/o pain w this. Denies recurrent UTI. Flomax started. PVR today is >1200 cc. He can not void for specimen.            Lab Results   Component Value Date     PSA 0.5 10/29/2024     PSA 0.5 10/19/2023      Cr 1.33.          Past Medical History:   Diagnosis Date    Agatston coronary artery calcium score between 100 and 199     Allergic rhinitis     Fracture of nasal bone     HLD (hyperlipidemia)      Past Surgical History:   Procedure Laterality Date    COSMETIC SURGERY  1984    Chin implant    EYE SURGERY  2007    Lasik    HERNIA REPAIR  Sep. 2015    Groin area     Current Outpatient Medications   Medication Sig Dispense Refill    tamsulosin (FLOMAX) 0.4 MG capsule Take 1 capsule by mouth daily 90 capsule 1    montelukast (SINGULAIR) 10 MG tablet Take 1 tablet by mouth daily 90 tablet 1    rosuvastatin (CRESTOR) 40 MG tablet Take 1 tablet by mouth daily 90 tablet 1    fluticasone (FLONASE) 50 MCG/ACT nasal spray 1 spray by Each Nostril route daily      levocetirizine (XYZAL) 5 MG tablet Take 1 tablet by mouth nightly       No current facility-administered medications for

## 2024-12-09 ENCOUNTER — HOSPITAL ENCOUNTER (OUTPATIENT)
Dept: CT IMAGING | Age: 64
Discharge: HOME | End: 2024-12-12
Attending: INTERNAL MEDICINE
Payer: MEDICARE

## 2024-12-09 DIAGNOSIS — I25.84 CORONARY ARTERY DISEASE DUE TO CALCIFIED CORONARY LESION: ICD-10-CM

## 2024-12-09 DIAGNOSIS — R10.84 GENERALIZED ABDOMINAL PAIN: ICD-10-CM

## 2024-12-09 DIAGNOSIS — I25.10 CORONARY ARTERY DISEASE DUE TO CALCIFIED CORONARY LESION: ICD-10-CM

## 2024-12-09 DIAGNOSIS — E78.2 MIXED HYPERLIPIDEMIA: ICD-10-CM

## 2024-12-09 DIAGNOSIS — R19.00 ABDOMINAL MASS, UNSPECIFIED ABDOMINAL LOCATION: ICD-10-CM

## 2024-12-09 DIAGNOSIS — R93.5 ABNORMAL ABDOMINAL ULTRASOUND: ICD-10-CM

## 2024-12-09 LAB
APPEARANCE UR: ABNORMAL
BACTERIA URNS QL MICRO: ABNORMAL /HPF
BILIRUB UR QL: NEGATIVE
COLOR UR: ABNORMAL
CREAT BLD-MCNC: 1.13 MG/DL (ref 0.8–1.5)
GLUCOSE UR STRIP.AUTO-MCNC: NEGATIVE MG/DL
HGB UR QL STRIP: ABNORMAL
KETONES UR QL STRIP.AUTO: NEGATIVE MG/DL
LEUKOCYTE ESTERASE UR QL STRIP.AUTO: ABNORMAL
NITRITE UR QL STRIP.AUTO: NEGATIVE
OTHER OBSERVATIONS: ABNORMAL
PH UR STRIP: 5.5 (ref 5–9)
PROT UR STRIP-MCNC: ABNORMAL MG/DL
SP GR UR REFRACTOMETRY: >1.035 (ref 1–1.02)
UROBILINOGEN UR QL STRIP.AUTO: 0.2 EU/DL (ref 0.2–1)
WBC URNS QL MICRO: >100 /HPF

## 2024-12-09 PROCEDURE — 74178 CT ABD&PLV WO CNTR FLWD CNTR: CPT

## 2024-12-09 PROCEDURE — 6360000004 HC RX CONTRAST MEDICATION: Performed by: INTERNAL MEDICINE

## 2024-12-09 PROCEDURE — 82565 ASSAY OF CREATININE: CPT

## 2024-12-09 RX ORDER — IOPAMIDOL 755 MG/ML
100 INJECTION, SOLUTION INTRAVASCULAR
Status: COMPLETED | OUTPATIENT
Start: 2024-12-09 | End: 2024-12-09

## 2024-12-09 RX ADMIN — IOPAMIDOL 100 ML: 755 INJECTION, SOLUTION INTRAVENOUS at 15:05

## 2024-12-11 ENCOUNTER — TELEPHONE (OUTPATIENT)
Dept: INTERNAL MEDICINE CLINIC | Facility: CLINIC | Age: 64
End: 2024-12-11

## 2024-12-11 DIAGNOSIS — I77.1 STENOSIS OF RIGHT ILIAC ARTERY (HCC): ICD-10-CM

## 2024-12-11 DIAGNOSIS — I73.9 PVD (PERIPHERAL VASCULAR DISEASE) (HCC): Primary | ICD-10-CM

## 2024-12-11 NOTE — TELEPHONE ENCOUNTER
Patient concerned about CT result, Mr Faye saw the result, but not your comment.  Per Dr Byrd, result was relayed CT  - incidentally showed a blockage in an artery going to his right leg.  Any pain in that leg?             Mr Faye states that he has no pain in the leg.

## 2024-12-12 ENCOUNTER — TELEPHONE (OUTPATIENT)
Dept: UROLOGY | Age: 64
End: 2024-12-12

## 2024-12-12 DIAGNOSIS — R33.9 URINE RETENTION: ICD-10-CM

## 2024-12-12 DIAGNOSIS — N40.1 BPH WITH OBSTRUCTION/LOWER URINARY TRACT SYMPTOMS: Primary | ICD-10-CM

## 2024-12-12 DIAGNOSIS — N13.8 BPH WITH OBSTRUCTION/LOWER URINARY TRACT SYMPTOMS: Primary | ICD-10-CM

## 2024-12-12 NOTE — TELEPHONE ENCOUNTER
Pt had Ct done yesterday. Kid stone found. Pt feels he might have uti. Sx: discomfort with urination.  Pt explain he is not constipated and not retaining urine.  Pt will have urine tested at outpt center. Order has been placed.

## 2024-12-16 DIAGNOSIS — N13.8 BPH WITH OBSTRUCTION/LOWER URINARY TRACT SYMPTOMS: ICD-10-CM

## 2024-12-16 DIAGNOSIS — R33.9 URINE RETENTION: ICD-10-CM

## 2024-12-16 DIAGNOSIS — N40.1 BPH WITH OBSTRUCTION/LOWER URINARY TRACT SYMPTOMS: ICD-10-CM

## 2024-12-16 LAB
APPEARANCE UR: ABNORMAL
BACTERIA URNS QL MICRO: ABNORMAL /HPF
BILIRUB UR QL: NEGATIVE
COLOR UR: ABNORMAL
EPI CELLS #/AREA URNS HPF: ABNORMAL /HPF (ref 0–5)
GLUCOSE UR STRIP.AUTO-MCNC: NEGATIVE MG/DL
HGB UR QL STRIP: ABNORMAL
HYALINE CASTS URNS QL MICRO: ABNORMAL /LPF
KETONES UR QL STRIP.AUTO: NEGATIVE MG/DL
LEUKOCYTE ESTERASE UR QL STRIP.AUTO: ABNORMAL
NITRITE UR QL STRIP.AUTO: NEGATIVE
PH UR STRIP: 5.5 (ref 5–9)
PROT UR STRIP-MCNC: ABNORMAL MG/DL
RBC #/AREA URNS HPF: ABNORMAL /HPF (ref 0–5)
SP GR UR REFRACTOMETRY: 1.01 (ref 1–1.02)
UROBILINOGEN UR QL STRIP.AUTO: 0.2 EU/DL (ref 0.2–1)
WBC URNS QL MICRO: >100 /HPF (ref 0–4)

## 2024-12-18 LAB
BACTERIA SPEC CULT: NORMAL
SERVICE CMNT-IMP: NORMAL

## 2025-01-02 ENCOUNTER — OFFICE VISIT (OUTPATIENT)
Dept: VASCULAR SURGERY | Age: 65
End: 2025-01-02
Payer: MEDICARE

## 2025-01-02 VITALS
SYSTOLIC BLOOD PRESSURE: 102 MMHG | BODY MASS INDEX: 23.16 KG/M2 | HEART RATE: 67 BPM | OXYGEN SATURATION: 99 % | HEIGHT: 65 IN | DIASTOLIC BLOOD PRESSURE: 64 MMHG | WEIGHT: 139 LBS

## 2025-01-02 DIAGNOSIS — I77.1 ILIAC ARTERY STENOSIS, RIGHT (HCC): Primary | ICD-10-CM

## 2025-01-02 PROCEDURE — 1123F ACP DISCUSS/DSCN MKR DOCD: CPT | Performed by: STUDENT IN AN ORGANIZED HEALTH CARE EDUCATION/TRAINING PROGRAM

## 2025-01-02 PROCEDURE — 1159F MED LIST DOCD IN RCRD: CPT | Performed by: STUDENT IN AN ORGANIZED HEALTH CARE EDUCATION/TRAINING PROGRAM

## 2025-01-02 PROCEDURE — 99203 OFFICE O/P NEW LOW 30 MIN: CPT | Performed by: STUDENT IN AN ORGANIZED HEALTH CARE EDUCATION/TRAINING PROGRAM

## 2025-01-02 NOTE — PROGRESS NOTES
VASCULAR SURGERY   [address] [address]  [phone] FAX: [phone]      Ishan Faye  : 1960    Reason for visit: Right EIA stenosis    Chief Complaint: 64 y.o. male presents incidental right EIA stenosis. He reports that he plays golf and goes to the gym frequently without claudication. Also denies rest pain and foot wounds.     Plan:   Asymptomatic right EIA stenosis: 81mg ASA, continue statin. F/u PRN    Imaging interrupted:   BLE Art DUS    Right side findings: Resting ARNOLD is 0.90. The lower extremity arterial duplex reveals mild, diffuse atherosclerosis without stenosis or occlusion. The great toe pressure is 71mmHg.    Left side findings: Resting ARNOLD is 1.06. The lower extremity arterial duplex reveals mild, diffuse atherosclerosis with a <50% stenosis of the SFA. The great toe pressure is 79mmHg.    Right External Iliac Artery: 50-75% stenosis in the proximal segments.    No claudication symptoms present.    CT Abd/pelvis with contrast  IMPRESSION:  1. No renal mass identified. Solitary nonobstructing stone in the left kidney.  2. Markedly distended urinary bladder with mild wall thickening and  trabeculation, suggesting chronic outlet obstruction. However, no  prostatomegaly. No bladder mass or stone identified.  3. Bilateral L5 spondylolysis.  4. 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     Physical Examination:   Height: 1.651 m (5' 5\"), Weight - Scale: 63 kg (139 lb), BP: 102/64    General: No acute distress  HENT: AT  CV: Regular rhythm.    LUNG: No respiratory distress on RA  Abdominal: No distension.  Extremities: No wounds or edema, motor and sensation grossly intact  Vascular:   Radial:  L    2+     R    2+    Femoral: L    2+     R    2+  DP:   L    2+     R    2+       Past Medical History:   Diagnosis Date    Agatston coronary artery calcium score between 100 and 199     Allergic rhinitis     Fracture of nasal bone     HLD

## 2025-01-13 ENCOUNTER — PATIENT MESSAGE (OUTPATIENT)
Dept: UROLOGY | Age: 65
End: 2025-01-13

## 2025-01-13 ENCOUNTER — PROCEDURE VISIT (OUTPATIENT)
Dept: UROLOGY | Age: 65
End: 2025-01-13
Payer: MEDICARE

## 2025-01-13 DIAGNOSIS — N13.8 BPH WITH OBSTRUCTION/LOWER URINARY TRACT SYMPTOMS: ICD-10-CM

## 2025-01-13 DIAGNOSIS — N40.1 BPH WITH OBSTRUCTION/LOWER URINARY TRACT SYMPTOMS: ICD-10-CM

## 2025-01-13 DIAGNOSIS — N40.1 BPH WITH OBSTRUCTION/LOWER URINARY TRACT SYMPTOMS: Primary | ICD-10-CM

## 2025-01-13 DIAGNOSIS — N13.8 BPH WITH OBSTRUCTION/LOWER URINARY TRACT SYMPTOMS: Primary | ICD-10-CM

## 2025-01-13 LAB
BILIRUBIN, URINE, POC: NEGATIVE
BLOOD URINE, POC: ABNORMAL
GLUCOSE URINE, POC: NEGATIVE MG/DL
KETONES, URINE, POC: NEGATIVE MG/DL
LEUKOCYTE ESTERASE, URINE, POC: ABNORMAL
NITRITE, URINE, POC: POSITIVE
PH, URINE, POC: 6 (ref 4.6–8)
PROTEIN,URINE, POC: NEGATIVE MG/DL
SPECIFIC GRAVITY, URINE, POC: 1.01 (ref 1–1.03)
URINALYSIS CLARITY, POC: ABNORMAL
URINALYSIS COLOR, POC: ABNORMAL
UROBILINOGEN, POC: ABNORMAL MG/DL

## 2025-01-13 PROCEDURE — 81003 URINALYSIS AUTO W/O SCOPE: CPT | Performed by: UROLOGY

## 2025-01-13 RX ORDER — AZITHROMYCIN 250 MG/1
TABLET, FILM COATED ORAL
COMMUNITY

## 2025-01-13 RX ORDER — DOXYCYCLINE HYCLATE 100 MG
1 TABLET ORAL 2 TIMES DAILY
COMMUNITY

## 2025-01-13 RX ORDER — CLINDAMYCIN HYDROCHLORIDE 150 MG/1
CAPSULE ORAL
COMMUNITY

## 2025-01-13 RX ORDER — CIPROFLOXACIN 500 MG/1
500 TABLET, FILM COATED ORAL 2 TIMES DAILY
Qty: 14 TABLET | Refills: 0 | Status: SHIPPED | OUTPATIENT
Start: 2025-01-13 | End: 2025-01-20

## 2025-01-13 NOTE — PROGRESS NOTES
UA - Dipstick  Results for orders placed or performed in visit on 01/13/25   AMB POC URINALYSIS DIP STICK AUTO W/O MICRO    Collection Time: 01/13/25  1:58 PM   Result Value Ref Range    Color (UA POC)      Clarity (UA POC)      Glucose, Urine, POC Negative Negative mg/dL    Bilirubin, Urine, POC Negative Negative    KETONES, Urine, POC Negative Negative mg/dL    Specific Gravity, Urine, POC 1.015 1.001 - 1.035    Blood (UA POC) Trace-intact     pH, Urine, POC 6.0 4.6 - 8.0    Protein, Urine, POC Negative Negative mg/dL    Urobilinogen, POC 0.2 mg/dL <1.1 mg/dL    Nitrite, Urine, POC Positive (A) Negative    Leukocyte Esterase, Urine, POC Large Negative       UA - Micro  WBC - 0  RBC - 0  Bacteria - 0  Epith - 0      Requested Prescriptions      No prescriptions requested or ordered in this encounter     Plan    Diagnosis Orders   1. BPH with obstruction/lower urinary tract symptoms  AMB POC URINALYSIS DIP STICK AUTO W/O MICRO    CYSTOURETHROSCOPY        Cysto canceled.  Ucx sent.  I start pt empirically on Cipro 500mg po bid #14.  RTO in one wk for cysto.    QUEENIE CERVANTES, DO

## 2025-01-13 NOTE — PROGRESS NOTES
[facility]  [address]   [address]  [place] [address]  [phone]    Ishan Faye  : 1960         HPI   64 y.o., male presents for cystoscopy       Past Medical History:   Diagnosis Date    Agatston coronary artery calcium score between 100 and 199     Allergic rhinitis     Fracture of nasal bone     HLD (hyperlipidemia)      Past Surgical History:   Procedure Laterality Date    COSMETIC SURGERY  1984    Chin implant    EYE SURGERY  2007    Lasik    HERNIA REPAIR  Sep. 2015    Groin area     Current Outpatient Medications   Medication Sig Dispense Refill    tamsulosin (FLOMAX) 0.4 MG capsule Take 1 capsule by mouth daily 90 capsule 1    montelukast (SINGULAIR) 10 MG tablet Take 1 tablet by mouth daily 90 tablet 1    rosuvastatin (CRESTOR) 40 MG tablet Take 1 tablet by mouth daily 90 tablet 1    fluticasone (FLONASE) 50 MCG/ACT nasal spray 1 spray by Each Nostril route daily      levocetirizine (XYZAL) 5 MG tablet Take 1 tablet by mouth nightly       No current facility-administered medications for this visit.     Allergies   Allergen Reactions    Erythromycin      Other Reaction(s): Not available    Penicillins Hives and Other (See Comments)    Sulfa Antibiotics Hives and Other (See Comments)     Social History     Socioeconomic History    Marital status:      Spouse name: Not on file    Number of children: Not on file    Years of education: Not on file    Highest education level: Not on file   Occupational History    Not on file   Tobacco Use    Smoking status: Never    Smokeless tobacco: Never    Tobacco comments:     Only smoked a few cigarettes in my life before turning 16   Vaping Use    Vaping status: Never Used   Substance and Sexual Activity    Alcohol use: Not Currently     Comment: I only take a few sips of wine during a Spiritism ceremony    Drug use: Never    Sexual activity: Not Currently     Partners: Female     Comment: No interaction since

## 2025-01-15 ENCOUNTER — TELEPHONE (OUTPATIENT)
Dept: UROLOGY | Age: 65
End: 2025-01-15

## 2025-01-15 LAB
BACTERIA SPEC CULT: ABNORMAL
SERVICE CMNT-IMP: ABNORMAL

## 2025-01-15 RX ORDER — NITROFURANTOIN 25; 75 MG/1; MG/1
100 CAPSULE ORAL 2 TIMES DAILY
Qty: 14 CAPSULE | Refills: 0 | Status: SHIPPED | OUTPATIENT
Start: 2025-01-15 | End: 2025-01-22

## 2025-01-15 NOTE — TELEPHONE ENCOUNTER
----- Message from Dr. Reynaldo Hardy, DO sent at 1/15/2025  3:40 PM EST -----  Please stop Cipro and start pt on Macrobid 100mg po bid #14.

## 2025-01-20 ENCOUNTER — PROCEDURE VISIT (OUTPATIENT)
Dept: UROLOGY | Age: 65
End: 2025-01-20
Payer: MEDICARE

## 2025-01-20 DIAGNOSIS — N40.1 BPH WITH OBSTRUCTION/LOWER URINARY TRACT SYMPTOMS: Primary | ICD-10-CM

## 2025-01-20 DIAGNOSIS — N13.8 BPH WITH OBSTRUCTION/LOWER URINARY TRACT SYMPTOMS: Primary | ICD-10-CM

## 2025-01-20 LAB
BILIRUBIN, URINE, POC: NEGATIVE
BLOOD URINE, POC: NEGATIVE
GLUCOSE URINE, POC: NEGATIVE MG/DL
KETONES, URINE, POC: NEGATIVE MG/DL
LEUKOCYTE ESTERASE, URINE, POC: NEGATIVE
NITRITE, URINE, POC: NEGATIVE
PH, URINE, POC: 6 (ref 4.6–8)
PROTEIN,URINE, POC: NEGATIVE MG/DL
SPECIFIC GRAVITY, URINE, POC: 1.01 (ref 1–1.03)
URINALYSIS CLARITY, POC: NORMAL
URINALYSIS COLOR, POC: NORMAL
UROBILINOGEN, POC: NORMAL MG/DL

## 2025-01-20 PROCEDURE — 81003 URINALYSIS AUTO W/O SCOPE: CPT | Performed by: UROLOGY

## 2025-01-20 PROCEDURE — 52000 CYSTOURETHROSCOPY: CPT | Performed by: UROLOGY

## 2025-01-20 NOTE — PROGRESS NOTES
[facility]  [address]   [address]  [place] [address]  [phone]    Ishan Faye  : 1960         HPI   64 y.o., male presents for cystoscopy.  Pt recently diagnosed with large PVR's.  Last PVR was 1200cc.  Pt refused CIC in past.  Recently diagnosed with Staph coag neg UTI on 25.  Remains on Macrobid.  No prior back issues.  Retired.  PSA was 0.5 on 10/29/24.  CT on 24 showed a 3mm LLP renal stone with bladder distension.  No hydro noted.  Cr is 1.13 on 24.  Reports mod LUTS on Flomax.      Past Medical History:   Diagnosis Date    Agatston coronary artery calcium score between 100 and 199     Allergic rhinitis     Fracture of nasal bone     HLD (hyperlipidemia)      Past Surgical History:   Procedure Laterality Date    COSMETIC SURGERY  1984    Chin implant    EYE SURGERY  2007    Lasik    HERNIA REPAIR  Sep. 2015    Groin area     Current Outpatient Medications   Medication Sig Dispense Refill    nitrofurantoin, macrocrystal-monohydrate, (MACROBID) 100 MG capsule Take 1 capsule by mouth 2 times daily for 7 days 14 capsule 0    ciprofloxacin (CIPRO) 500 MG tablet Take 1 tablet by mouth 2 times daily for 7 days 14 tablet 0    azithromycin (ZITHROMAX) 250 MG tablet TAKE 2 TABLETS BY MOUTH ON DAY 1, AND THEN TAKE 1 TABLET BY MOUTH ONCE A DAY ON DAY 2 THROUGH DAY 5      clindamycin (CLEOCIN) 150 MG capsule TAKE 2 CAPSULES BY MOUTH NOW THEN 1 FOUR TIMES DAILY UNTIL GONE      doxycycline hyclate (VIBRA-TABS) 100 MG tablet Take 1 tablet by mouth in the morning and at bedtime      tamsulosin (FLOMAX) 0.4 MG capsule Take 1 capsule by mouth daily 90 capsule 1    montelukast (SINGULAIR) 10 MG tablet Take 1 tablet by mouth daily 90 tablet 1    rosuvastatin (CRESTOR) 40 MG tablet Take 1 tablet by mouth daily 90 tablet 1    fluticasone (FLONASE) 50 MCG/ACT nasal spray 1 spray by Each Nostril route daily      levocetirizine (XYZAL) 5 MG tablet Take 1 tablet by mouth

## 2025-02-23 DIAGNOSIS — J30.9 ALLERGIC RHINITIS, UNSPECIFIED SEASONALITY, UNSPECIFIED TRIGGER: ICD-10-CM

## 2025-02-24 ENCOUNTER — PATIENT MESSAGE (OUTPATIENT)
Dept: INTERNAL MEDICINE CLINIC | Facility: CLINIC | Age: 65
End: 2025-02-24

## 2025-02-24 DIAGNOSIS — J30.9 ALLERGIC RHINITIS, UNSPECIFIED SEASONALITY, UNSPECIFIED TRIGGER: ICD-10-CM

## 2025-02-24 RX ORDER — MONTELUKAST SODIUM 10 MG/1
10 TABLET ORAL DAILY
Qty: 90 TABLET | Refills: 0 | Status: SHIPPED | OUTPATIENT
Start: 2025-02-24

## 2025-02-24 RX ORDER — MONTELUKAST SODIUM 10 MG/1
10 TABLET ORAL DAILY
Qty: 90 TABLET | Refills: 0 | Status: SHIPPED | OUTPATIENT
Start: 2025-02-24 | End: 2025-02-24 | Stop reason: SDUPTHER

## 2025-02-24 RX ORDER — ROSUVASTATIN CALCIUM 40 MG/1
40 TABLET, COATED ORAL DAILY
Qty: 90 TABLET | Refills: 0 | Status: SHIPPED | OUTPATIENT
Start: 2025-02-24

## 2025-03-24 ENCOUNTER — OFFICE VISIT (OUTPATIENT)
Dept: INTERNAL MEDICINE CLINIC | Facility: CLINIC | Age: 65
End: 2025-03-24
Payer: MEDICARE

## 2025-03-24 ENCOUNTER — TELEPHONE (OUTPATIENT)
Dept: INTERNAL MEDICINE CLINIC | Facility: CLINIC | Age: 65
End: 2025-03-24

## 2025-03-24 VITALS
WEIGHT: 140 LBS | SYSTOLIC BLOOD PRESSURE: 124 MMHG | TEMPERATURE: 97.5 F | OXYGEN SATURATION: 100 % | BODY MASS INDEX: 23.32 KG/M2 | RESPIRATION RATE: 16 BRPM | HEIGHT: 65 IN | DIASTOLIC BLOOD PRESSURE: 68 MMHG | HEART RATE: 60 BPM

## 2025-03-24 DIAGNOSIS — M54.9 CHRONIC BACK PAIN, UNSPECIFIED BACK LOCATION, UNSPECIFIED BACK PAIN LATERALITY: Primary | ICD-10-CM

## 2025-03-24 DIAGNOSIS — M54.50 ACUTE RIGHT-SIDED LOW BACK PAIN WITHOUT SCIATICA: ICD-10-CM

## 2025-03-24 DIAGNOSIS — Z00.00 MEDICARE ANNUAL WELLNESS VISIT, INITIAL: Primary | ICD-10-CM

## 2025-03-24 DIAGNOSIS — G89.29 CHRONIC BACK PAIN, UNSPECIFIED BACK LOCATION, UNSPECIFIED BACK PAIN LATERALITY: Primary | ICD-10-CM

## 2025-03-24 PROCEDURE — 1159F MED LIST DOCD IN RCRD: CPT | Performed by: INTERNAL MEDICINE

## 2025-03-24 PROCEDURE — 1160F RVW MEDS BY RX/DR IN RCRD: CPT | Performed by: INTERNAL MEDICINE

## 2025-03-24 PROCEDURE — 99213 OFFICE O/P EST LOW 20 MIN: CPT | Performed by: INTERNAL MEDICINE

## 2025-03-24 PROCEDURE — 1123F ACP DISCUSS/DSCN MKR DOCD: CPT | Performed by: INTERNAL MEDICINE

## 2025-03-24 PROCEDURE — G0438 PPPS, INITIAL VISIT: HCPCS | Performed by: INTERNAL MEDICINE

## 2025-03-24 SDOH — ECONOMIC STABILITY: FOOD INSECURITY: WITHIN THE PAST 12 MONTHS, YOU WORRIED THAT YOUR FOOD WOULD RUN OUT BEFORE YOU GOT MONEY TO BUY MORE.: NEVER TRUE

## 2025-03-24 SDOH — ECONOMIC STABILITY: FOOD INSECURITY: WITHIN THE PAST 12 MONTHS, THE FOOD YOU BOUGHT JUST DIDN'T LAST AND YOU DIDN'T HAVE MONEY TO GET MORE.: NEVER TRUE

## 2025-03-24 ASSESSMENT — PATIENT HEALTH QUESTIONNAIRE - PHQ9
2. FEELING DOWN, DEPRESSED OR HOPELESS: NOT AT ALL
SUM OF ALL RESPONSES TO PHQ QUESTIONS 1-9: 0
1. LITTLE INTEREST OR PLEASURE IN DOING THINGS: NOT AT ALL
SUM OF ALL RESPONSES TO PHQ QUESTIONS 1-9: 0
1. LITTLE INTEREST OR PLEASURE IN DOING THINGS: NOT AT ALL
SUM OF ALL RESPONSES TO PHQ QUESTIONS 1-9: 0
2. FEELING DOWN, DEPRESSED OR HOPELESS: NOT AT ALL

## 2025-03-24 ASSESSMENT — ENCOUNTER SYMPTOMS
BACK PAIN: 1
BOWEL INCONTINENCE: 0

## 2025-03-24 ASSESSMENT — LIFESTYLE VARIABLES
HOW MANY STANDARD DRINKS CONTAINING ALCOHOL DO YOU HAVE ON A TYPICAL DAY: PATIENT DOES NOT DRINK
HOW OFTEN DO YOU HAVE A DRINK CONTAINING ALCOHOL: NEVER

## 2025-03-24 NOTE — PATIENT INSTRUCTIONS
Please arrive 20 minutes prior to your scheduled time to see the doctor to allow sufficient time for check-in and rooming.      Please bring all your prescription bottles to each appointment.      I recommend all standard healthcare maintenance and cancer screenings (Colon cancer screening if due, Lung cancer screening if due, Mammogram and Bone Density if female and appropriate, etc) and standard CDC recommended immunizations (https://www.cdc.gov/vaccines-adults/recommended-vaccines/index.html and https://www.cdc.gov/vaccines/imz-schedules/adult-easyread.html  ) as appropriate and due (unless contraindicated)  to lower your risk for potentially preventable morbidity/mortality from these diseases.     Check BP 1-2 times per week.  Call our office if BP runs above 140/80.     Recommend tobacco cessation if you use tobacco products.         A Healthy Heart: Care Instructions  Overview     Coronary artery disease, also called heart disease, occurs when a substance called plaque builds up in the vessels that supply oxygen-rich blood to your heart muscle. This can narrow the blood vessels and reduce blood flow. A heart attack happens when blood flow is completely blocked. A high-fat diet, smoking, and other factors increase the risk of heart disease.  Your doctor has found that you have a chance of having heart disease. A heart-healthy lifestyle can help keep your heart healthy and prevent heart disease. This lifestyle includes eating healthy, being active, staying at a weight that's healthy for you, and not smoking or using tobacco. It also includes taking medicines as directed, managing other health conditions, and trying to get a healthy amount of sleep.  Follow-up care is a key part of your treatment and safety. Be sure to make and go to all appointments, and call your doctor if you are having problems. It's also a good idea to know your test results and keep a list of the medicines you take.  How can you care for

## 2025-03-24 NOTE — PROGRESS NOTES
Tan Byrd M.D.  [place] [address]  Phone: [phone]  Fax: [phone]    Back Pain  This is a new problem. The current episode started in the past 7 days (Friday.  Had been playing daily golf.). The problem occurs constantly. The problem has been gradually improving since onset. The pain is present in the lumbar spine (Right paraspinal muscles.). The quality of the pain is described as stabbing. The pain is at a severity of 4/10 (Not able to quanitfy.). The pain is moderate. The symptoms are aggravated by bending, twisting and standing. Pertinent negatives include no bladder incontinence, bowel incontinence, dysuria, fever, leg pain, numbness, paresthesias, perianal numbness, tingling or weakness. He has tried NSAIDs for the symptoms. The treatment provided moderate relief.      Ishan Faye is a 64 y.o. White (non-) male.     Current Outpatient Medications   Medication Sig Dispense Refill    montelukast (SINGULAIR) 10 MG tablet Take 1 tablet by mouth daily 90 tablet 0    rosuvastatin (CRESTOR) 40 MG tablet Take 1 tablet by mouth daily 90 tablet 0    azithromycin (ZITHROMAX) 250 MG tablet TAKE 2 TABLETS BY MOUTH ON DAY 1, AND THEN TAKE 1 TABLET BY MOUTH ONCE A DAY ON DAY 2 THROUGH DAY 5      clindamycin (CLEOCIN) 150 MG capsule TAKE 2 CAPSULES BY MOUTH NOW THEN 1 FOUR TIMES DAILY UNTIL GONE      doxycycline hyclate (VIBRA-TABS) 100 MG tablet Take 1 tablet by mouth in the morning and at bedtime      tamsulosin (FLOMAX) 0.4 MG capsule Take 1 capsule by mouth daily 90 capsule 1    fluticasone (FLONASE) 50 MCG/ACT nasal spray 1 spray by Each Nostril route daily      levocetirizine (XYZAL) 5 MG tablet Take 1 tablet by mouth nightly       No current facility-administered medications for this visit.     Allergies   Allergen Reactions    Erythromycin      Other Reaction(s): Not available    Penicillins Hives and Other (See Comments)    Sulfa

## 2025-04-26 SDOH — ECONOMIC STABILITY: INCOME INSECURITY: IN THE LAST 12 MONTHS, WAS THERE A TIME WHEN YOU WERE NOT ABLE TO PAY THE MORTGAGE OR RENT ON TIME?: YES

## 2025-04-26 SDOH — ECONOMIC STABILITY: FOOD INSECURITY: WITHIN THE PAST 12 MONTHS, YOU WORRIED THAT YOUR FOOD WOULD RUN OUT BEFORE YOU GOT MONEY TO BUY MORE.: SOMETIMES TRUE

## 2025-04-26 SDOH — ECONOMIC STABILITY: TRANSPORTATION INSECURITY
IN THE PAST 12 MONTHS, HAS LACK OF TRANSPORTATION KEPT YOU FROM MEETINGS, WORK, OR FROM GETTING THINGS NEEDED FOR DAILY LIVING?: NO

## 2025-04-26 SDOH — ECONOMIC STABILITY: TRANSPORTATION INSECURITY
IN THE PAST 12 MONTHS, HAS THE LACK OF TRANSPORTATION KEPT YOU FROM MEDICAL APPOINTMENTS OR FROM GETTING MEDICATIONS?: NO

## 2025-04-26 SDOH — ECONOMIC STABILITY: FOOD INSECURITY: WITHIN THE PAST 12 MONTHS, THE FOOD YOU BOUGHT JUST DIDN'T LAST AND YOU DIDN'T HAVE MONEY TO GET MORE.: SOMETIMES TRUE

## 2025-04-29 ENCOUNTER — OFFICE VISIT (OUTPATIENT)
Dept: INTERNAL MEDICINE CLINIC | Facility: CLINIC | Age: 65
End: 2025-04-29
Payer: MEDICARE

## 2025-04-29 VITALS
BODY MASS INDEX: 22.99 KG/M2 | WEIGHT: 138 LBS | TEMPERATURE: 98.1 F | RESPIRATION RATE: 16 BRPM | HEIGHT: 65 IN | OXYGEN SATURATION: 95 % | SYSTOLIC BLOOD PRESSURE: 126 MMHG | DIASTOLIC BLOOD PRESSURE: 70 MMHG | HEART RATE: 65 BPM

## 2025-04-29 DIAGNOSIS — I25.10 CORONARY ARTERY DISEASE DUE TO CALCIFIED CORONARY LESION: ICD-10-CM

## 2025-04-29 DIAGNOSIS — R73.03 PREDIABETES: ICD-10-CM

## 2025-04-29 DIAGNOSIS — I25.10 CORONARY ARTERY DISEASE DUE TO CALCIFIED CORONARY LESION: Primary | ICD-10-CM

## 2025-04-29 DIAGNOSIS — E78.2 MIXED HYPERLIPIDEMIA: ICD-10-CM

## 2025-04-29 DIAGNOSIS — I25.84 CORONARY ARTERY DISEASE DUE TO CALCIFIED CORONARY LESION: ICD-10-CM

## 2025-04-29 DIAGNOSIS — J30.9 ALLERGIC RHINITIS, UNSPECIFIED SEASONALITY, UNSPECIFIED TRIGGER: ICD-10-CM

## 2025-04-29 DIAGNOSIS — I25.84 CORONARY ARTERY DISEASE DUE TO CALCIFIED CORONARY LESION: Primary | ICD-10-CM

## 2025-04-29 LAB
ALBUMIN SERPL-MCNC: 4.5 G/DL (ref 3.2–4.6)
ALBUMIN/GLOB SERPL: 1.6 (ref 1–1.9)
ALP SERPL-CCNC: 66 U/L (ref 40–129)
ALT SERPL-CCNC: 40 U/L (ref 8–55)
ANION GAP SERPL CALC-SCNC: 12 MMOL/L (ref 7–16)
AST SERPL-CCNC: 42 U/L (ref 15–37)
BASOPHILS # BLD: 0.08 K/UL (ref 0–0.2)
BASOPHILS NFR BLD: 1.2 % (ref 0–2)
BILIRUB DIRECT SERPL-MCNC: 0.3 MG/DL (ref 0–0.3)
BILIRUB SERPL-MCNC: 0.8 MG/DL (ref 0–1.2)
BUN SERPL-MCNC: 18 MG/DL (ref 8–23)
CALCIUM SERPL-MCNC: 9.8 MG/DL (ref 8.8–10.2)
CHLORIDE SERPL-SCNC: 105 MMOL/L (ref 98–107)
CHOLEST SERPL-MCNC: 114 MG/DL (ref 0–200)
CO2 SERPL-SCNC: 22 MMOL/L (ref 20–29)
CREAT SERPL-MCNC: 1.17 MG/DL (ref 0.8–1.3)
DIFFERENTIAL METHOD BLD: ABNORMAL
EOSINOPHIL # BLD: 0.4 K/UL (ref 0–0.8)
EOSINOPHIL NFR BLD: 5.9 % (ref 0.5–7.8)
ERYTHROCYTE [DISTWIDTH] IN BLOOD BY AUTOMATED COUNT: 12.5 % (ref 11.9–14.6)
EST. AVERAGE GLUCOSE BLD GHB EST-MCNC: 115 MG/DL
GLOBULIN SER CALC-MCNC: 2.8 G/DL (ref 2.3–3.5)
GLUCOSE SERPL-MCNC: 84 MG/DL (ref 70–99)
HBA1C MFR BLD: 5.6 % (ref 0–5.6)
HCT VFR BLD AUTO: 39.3 % (ref 41.1–50.3)
HDLC SERPL-MCNC: 53 MG/DL (ref 40–60)
HDLC SERPL: 2.2 (ref 0–5)
HGB BLD-MCNC: 14.2 G/DL (ref 13.6–17.2)
IMM GRANULOCYTES # BLD AUTO: 0.01 K/UL (ref 0–0.5)
IMM GRANULOCYTES NFR BLD AUTO: 0.1 % (ref 0–5)
LDLC SERPL CALC-MCNC: 49 MG/DL (ref 0–100)
LYMPHOCYTES # BLD: 2.05 K/UL (ref 0.5–4.6)
LYMPHOCYTES NFR BLD: 30.1 % (ref 13–44)
MCH RBC QN AUTO: 30.6 PG (ref 26.1–32.9)
MCHC RBC AUTO-ENTMCNC: 36.1 G/DL (ref 31.4–35)
MCV RBC AUTO: 84.7 FL (ref 82–102)
MONOCYTES # BLD: 0.63 K/UL (ref 0.1–1.3)
MONOCYTES NFR BLD: 9.3 % (ref 4–12)
NEUTS SEG # BLD: 3.64 K/UL (ref 1.7–8.2)
NEUTS SEG NFR BLD: 53.4 % (ref 43–78)
NRBC # BLD: 0 K/UL (ref 0–0.2)
PLATELET # BLD AUTO: 216 K/UL (ref 150–450)
PMV BLD AUTO: 10.6 FL (ref 9.4–12.3)
POTASSIUM SERPL-SCNC: 4.5 MMOL/L (ref 3.5–5.1)
PROT SERPL-MCNC: 7.3 G/DL (ref 6.3–8.2)
RBC # BLD AUTO: 4.64 M/UL (ref 4.23–5.6)
SODIUM SERPL-SCNC: 139 MMOL/L (ref 136–145)
TRIGL SERPL-MCNC: 61 MG/DL (ref 0–150)
VLDLC SERPL CALC-MCNC: 12 MG/DL (ref 6–23)
WBC # BLD AUTO: 6.8 K/UL (ref 4.3–11.1)

## 2025-04-29 PROCEDURE — 1160F RVW MEDS BY RX/DR IN RCRD: CPT | Performed by: INTERNAL MEDICINE

## 2025-04-29 PROCEDURE — G2211 COMPLEX E/M VISIT ADD ON: HCPCS | Performed by: INTERNAL MEDICINE

## 2025-04-29 PROCEDURE — 1159F MED LIST DOCD IN RCRD: CPT | Performed by: INTERNAL MEDICINE

## 2025-04-29 PROCEDURE — 99214 OFFICE O/P EST MOD 30 MIN: CPT | Performed by: INTERNAL MEDICINE

## 2025-04-29 PROCEDURE — 1123F ACP DISCUSS/DSCN MKR DOCD: CPT | Performed by: INTERNAL MEDICINE

## 2025-04-29 RX ORDER — ROSUVASTATIN CALCIUM 40 MG/1
40 TABLET, COATED ORAL DAILY
Qty: 100 TABLET | Refills: 1 | Status: SHIPPED | OUTPATIENT
Start: 2025-04-29

## 2025-04-29 RX ORDER — MONTELUKAST SODIUM 10 MG/1
10 TABLET ORAL DAILY
Qty: 100 TABLET | Refills: 1 | Status: SHIPPED | OUTPATIENT
Start: 2025-04-29

## 2025-04-29 NOTE — PATIENT INSTRUCTIONS
apply-  Option 1: Fill out the Financial Assistance Application(FAP). Copies of the Financial Assistance Application and the FAP may be obtained for free by calling the Appointuiter service department at [phone].   Option 2: download a copy from the [employer] website: https://[email or web address].[employer]/patient-resources/financial-assistance       [employer]  They offer: medication cost assistance, personal care items, and small durable medical equipment to low income and uninsured patients with chronic health conditions.   Contact: [phone] or [phone]     [facility]  They offer: connections to financial assistance for social and medical services for low income and uninsured persons.   Contact: [phone] (leave message with name and contact number if no answer).    [facility]  They offer: assist people experiencing poverty connect with the resources they need to thrive.  Contact: [phone]         Medication Assistance:    [facility]   They offer: Coupons for discounts on medications.   Website: https://www.[employer]olve/       [employer]   They offer: free information on medications and healthcare cost savings programs including prescription assistance programs, coupons, and discount programs.   Contact: [phone]; https://www.[employer]oPayments.org/     [facility]   They offer: Information about free and low-cost medicine programs.   Website: https://www.rxassist.org/     [facility] $4 Prescription Program   They offer: Prescription Program; includes up to a 30-day supply for $4 and a 90-day supply for $10 of some covered generic drugs at commonly prescribed dosages   Website: https://www.vmock.com/cp/4-prescriptions/57260       [facility]  They offer: If you are uninsured and cannot afford the prescription medicine you need, you may be able to have your prescriptions filled at no cost through [employer].  You must live in [place].   Helpful Info: To find out if you qualify visit

## 2025-04-29 NOTE — PROGRESS NOTES
Tan Byrd M[place] [address]  Phone: [phone]  Fax: [phone]    Hyperlipidemia  This is a chronic problem.     Ishan Faye is a 64 y.o. White (non-) male.     Current Outpatient Medications   Medication Sig Dispense Refill    rosuvastatin (CRESTOR) 40 MG tablet Take 1 tablet by mouth daily 100 tablet 1    montelukast (SINGULAIR) 10 MG tablet Take 1 tablet by mouth daily 100 tablet 1    tamsulosin (FLOMAX) 0.4 MG capsule Take 1 capsule by mouth daily 90 capsule 1    fluticasone (FLONASE) 50 MCG/ACT nasal spray 1 spray by Each Nostril route daily      levocetirizine (XYZAL) 5 MG tablet Take 1 tablet by mouth nightly       No current facility-administered medications for this visit.     Allergies   Allergen Reactions    Erythromycin      Other Reaction(s): Not available    Penicillins Hives and Other (See Comments)    Sulfa Antibiotics Hives and Other (See Comments)     Past Medical History:   Diagnosis Date    Agatston coronary artery calcium score between 100 and 199     Allergic rhinitis     Fracture of nasal bone     HLD (hyperlipidemia)      Past Surgical History:   Procedure Laterality Date    COSMETIC SURGERY  Feb. 1984    Chin implant    EYE SURGERY  Nov. 2007    Lasik    HERNIA REPAIR  Sep. 2015    Groin area     Social History     Tobacco Use    Smoking status: Never    Smokeless tobacco: Never    Tobacco comments:     Only smoked a few cigarettes in my life before turning 16   Vaping Use    Vaping status: Never Used   Substance Use Topics    Alcohol use: Not Currently     Comment: I only take a few sips of wine during a [employer] ceremony    Drug use: Never     Family History   Problem Relation Age of Onset    Diabetes Mother     Cervical Cancer Mother     Heart Murmur Father     Liver Cancer Father     Heart Attack Maternal Grandfather     Colon Cancer Neg Hx     Thyroid Cancer Neg Hx       Review of

## 2025-04-30 ENCOUNTER — RESULTS FOLLOW-UP (OUTPATIENT)
Dept: INTERNAL MEDICINE CLINIC | Facility: CLINIC | Age: 65
End: 2025-04-30

## 2025-05-17 DIAGNOSIS — N13.8 BPH WITH OBSTRUCTION/LOWER URINARY TRACT SYMPTOMS: ICD-10-CM

## 2025-05-17 DIAGNOSIS — R33.9 URINE RETENTION: ICD-10-CM

## 2025-05-17 DIAGNOSIS — N40.1 BPH WITH OBSTRUCTION/LOWER URINARY TRACT SYMPTOMS: ICD-10-CM

## 2025-05-18 DIAGNOSIS — R33.9 URINE RETENTION: ICD-10-CM

## 2025-05-18 DIAGNOSIS — N40.1 BPH WITH OBSTRUCTION/LOWER URINARY TRACT SYMPTOMS: ICD-10-CM

## 2025-05-18 DIAGNOSIS — N13.8 BPH WITH OBSTRUCTION/LOWER URINARY TRACT SYMPTOMS: ICD-10-CM

## 2025-05-18 RX ORDER — TAMSULOSIN HYDROCHLORIDE 0.4 MG/1
0.4 CAPSULE ORAL DAILY
Qty: 90 CAPSULE | Refills: 0 | Status: SHIPPED | OUTPATIENT
Start: 2025-05-18 | End: 2025-05-18 | Stop reason: SDUPTHER

## 2025-05-18 RX ORDER — TAMSULOSIN HYDROCHLORIDE 0.4 MG/1
0.4 CAPSULE ORAL DAILY
Qty: 90 CAPSULE | Refills: 3 | Status: SHIPPED | OUTPATIENT
Start: 2025-05-18

## 2025-08-11 DIAGNOSIS — N40.1 BPH WITH OBSTRUCTION/LOWER URINARY TRACT SYMPTOMS: ICD-10-CM

## 2025-08-11 DIAGNOSIS — N13.8 BPH WITH OBSTRUCTION/LOWER URINARY TRACT SYMPTOMS: ICD-10-CM

## 2025-08-11 DIAGNOSIS — R33.9 URINE RETENTION: ICD-10-CM

## 2025-08-11 RX ORDER — TAMSULOSIN HYDROCHLORIDE 0.4 MG/1
0.4 CAPSULE ORAL DAILY
Qty: 90 CAPSULE | Refills: 3 | Status: SHIPPED | OUTPATIENT
Start: 2025-08-11